# Patient Record
Sex: FEMALE | Race: ASIAN | Employment: FULL TIME | URBAN - METROPOLITAN AREA
[De-identification: names, ages, dates, MRNs, and addresses within clinical notes are randomized per-mention and may not be internally consistent; named-entity substitution may affect disease eponyms.]

---

## 2017-09-26 ENCOUNTER — HOSPITAL ENCOUNTER (OUTPATIENT)
Dept: ULTRASOUND IMAGING | Age: 52
Discharge: HOME | End: 2017-09-26
Payer: COMMERCIAL

## 2017-09-26 ENCOUNTER — HOSPITAL ENCOUNTER (OUTPATIENT)
Dept: GENERAL RADIOLOGY | Age: 52
Discharge: HOME | End: 2017-09-26
Payer: COMMERCIAL

## 2017-09-26 DIAGNOSIS — N92.6 IRREGULAR MENSES: ICD-10-CM

## 2017-09-26 DIAGNOSIS — R13.11 ORAL PHASE DYSPHAGIA: ICD-10-CM

## 2017-09-26 PROCEDURE — 2500000003 HC RX 250 WO HCPCS: Performed by: FAMILY MEDICINE

## 2017-09-26 PROCEDURE — 76830 TRANSVAGINAL US NON-OB: CPT

## 2017-09-26 PROCEDURE — 74220 X-RAY XM ESOPHAGUS 1CNTRST: CPT

## 2017-09-26 PROCEDURE — 76856 US EXAM PELVIC COMPLETE: CPT

## 2017-09-26 RX ADMIN — BARIUM SULFATE 140 ML: 980 POWDER, FOR SUSPENSION ORAL at 10:32

## 2017-09-26 RX ADMIN — BARIUM SULFATE 160 ML: 960 POWDER, FOR SUSPENSION ORAL at 10:32

## 2019-01-24 DIAGNOSIS — R13.11 ORAL PHASE DYSPHAGIA: ICD-10-CM

## 2019-01-24 RX ORDER — RANITIDINE 300 MG/1
TABLET ORAL
Qty: 90 TABLET | Refills: 1 | OUTPATIENT
Start: 2019-01-24

## 2019-01-29 ENCOUNTER — OFFICE VISIT (OUTPATIENT)
Dept: PRIMARY CARE CLINIC | Age: 54
End: 2019-01-29
Payer: COMMERCIAL

## 2019-01-29 VITALS
WEIGHT: 144.6 LBS | DIASTOLIC BLOOD PRESSURE: 86 MMHG | HEART RATE: 72 BPM | SYSTOLIC BLOOD PRESSURE: 124 MMHG | BODY MASS INDEX: 25.82 KG/M2 | OXYGEN SATURATION: 99 %

## 2019-01-29 DIAGNOSIS — K21.9 GASTROESOPHAGEAL REFLUX DISEASE, ESOPHAGITIS PRESENCE NOT SPECIFIED: Primary | ICD-10-CM

## 2019-01-29 DIAGNOSIS — R13.11 ORAL PHASE DYSPHAGIA: ICD-10-CM

## 2019-01-29 DIAGNOSIS — R06.02 SOB (SHORTNESS OF BREATH): ICD-10-CM

## 2019-01-29 PROCEDURE — G8427 DOCREV CUR MEDS BY ELIG CLIN: HCPCS | Performed by: FAMILY MEDICINE

## 2019-01-29 PROCEDURE — G8484 FLU IMMUNIZE NO ADMIN: HCPCS | Performed by: FAMILY MEDICINE

## 2019-01-29 PROCEDURE — G8419 CALC BMI OUT NRM PARAM NOF/U: HCPCS | Performed by: FAMILY MEDICINE

## 2019-01-29 PROCEDURE — 99213 OFFICE O/P EST LOW 20 MIN: CPT | Performed by: FAMILY MEDICINE

## 2019-01-29 PROCEDURE — 1036F TOBACCO NON-USER: CPT | Performed by: FAMILY MEDICINE

## 2019-01-29 PROCEDURE — 3017F COLORECTAL CA SCREEN DOC REV: CPT | Performed by: FAMILY MEDICINE

## 2019-01-29 RX ORDER — RANITIDINE 300 MG/1
300 TABLET ORAL DAILY
Qty: 90 TABLET | Refills: 3 | Status: SHIPPED | OUTPATIENT
Start: 2019-01-29 | End: 2019-09-24 | Stop reason: ALTCHOICE

## 2019-01-29 ASSESSMENT — PATIENT HEALTH QUESTIONNAIRE - PHQ9
SUM OF ALL RESPONSES TO PHQ QUESTIONS 1-9: 0
1. LITTLE INTEREST OR PLEASURE IN DOING THINGS: 0
SUM OF ALL RESPONSES TO PHQ9 QUESTIONS 1 & 2: 0
SUM OF ALL RESPONSES TO PHQ QUESTIONS 1-9: 0
2. FEELING DOWN, DEPRESSED OR HOPELESS: 0

## 2019-02-26 ENCOUNTER — OFFICE VISIT (OUTPATIENT)
Dept: PRIMARY CARE CLINIC | Age: 54
End: 2019-02-26
Payer: COMMERCIAL

## 2019-02-26 VITALS
BODY MASS INDEX: 25.76 KG/M2 | OXYGEN SATURATION: 99 % | HEIGHT: 63 IN | SYSTOLIC BLOOD PRESSURE: 124 MMHG | DIASTOLIC BLOOD PRESSURE: 70 MMHG | WEIGHT: 145.4 LBS | HEART RATE: 80 BPM

## 2019-02-26 DIAGNOSIS — K59.09 OTHER CONSTIPATION: ICD-10-CM

## 2019-02-26 DIAGNOSIS — Z13.220 SCREENING FOR HYPERLIPIDEMIA: ICD-10-CM

## 2019-02-26 DIAGNOSIS — K64.4 EXTERNAL HEMORRHOID: ICD-10-CM

## 2019-02-26 DIAGNOSIS — Z12.39 BREAST CANCER SCREENING: ICD-10-CM

## 2019-02-26 DIAGNOSIS — Z13.1 ENCOUNTER FOR SCREENING FOR DIABETES MELLITUS: ICD-10-CM

## 2019-02-26 DIAGNOSIS — Z12.31 ENCOUNTER FOR SCREENING MAMMOGRAM FOR BREAST CANCER: ICD-10-CM

## 2019-02-26 DIAGNOSIS — Z01.419 ENCOUNTER FOR GYNECOLOGICAL EXAMINATION WITHOUT ABNORMAL FINDING: Primary | ICD-10-CM

## 2019-02-26 PROCEDURE — G8484 FLU IMMUNIZE NO ADMIN: HCPCS | Performed by: FAMILY MEDICINE

## 2019-02-26 PROCEDURE — 99396 PREV VISIT EST AGE 40-64: CPT | Performed by: FAMILY MEDICINE

## 2019-02-26 RX ORDER — DOCUSATE SODIUM 100 MG/1
100 CAPSULE, LIQUID FILLED ORAL DAILY
Qty: 30 CAPSULE | Refills: 5 | COMMUNITY
Start: 2019-02-26 | End: 2019-04-09 | Stop reason: ALTCHOICE

## 2019-02-28 LAB — GYNECOLOGY CYTOLOGY REPORT: NORMAL

## 2019-03-05 ENCOUNTER — HOSPITAL ENCOUNTER (OUTPATIENT)
Age: 54
Discharge: HOME | End: 2019-03-05
Payer: COMMERCIAL

## 2019-03-05 ENCOUNTER — HOSPITAL ENCOUNTER (OUTPATIENT)
Dept: WOMENS IMAGING | Age: 54
Discharge: HOME | End: 2019-03-07
Payer: COMMERCIAL

## 2019-03-05 DIAGNOSIS — Z13.1 ENCOUNTER FOR SCREENING FOR DIABETES MELLITUS: ICD-10-CM

## 2019-03-05 DIAGNOSIS — Z12.31 ENCOUNTER FOR SCREENING MAMMOGRAM FOR BREAST CANCER: ICD-10-CM

## 2019-03-05 DIAGNOSIS — Z13.220 SCREENING FOR HYPERLIPIDEMIA: ICD-10-CM

## 2019-03-05 LAB
CHOLESTEROL, FASTING: 192 MG/DL
CHOLESTEROL/HDL RATIO: 2.9
GLUCOSE FASTING: 100 MG/DL (ref 70–99)
HDLC SERPL-MCNC: 66 MG/DL
LDL CHOLESTEROL: 95 MG/DL (ref 0–130)
TRIGLYCERIDE, FASTING: 156 MG/DL
VLDLC SERPL CALC-MCNC: ABNORMAL MG/DL (ref 1–30)

## 2019-03-05 PROCEDURE — 80061 LIPID PANEL: CPT

## 2019-03-05 PROCEDURE — 36415 COLL VENOUS BLD VENIPUNCTURE: CPT

## 2019-03-05 PROCEDURE — 77063 BREAST TOMOSYNTHESIS BI: CPT

## 2019-03-05 PROCEDURE — 82947 ASSAY GLUCOSE BLOOD QUANT: CPT

## 2019-03-20 ENCOUNTER — PROCEDURE VISIT (OUTPATIENT)
Dept: PRIMARY CARE CLINIC | Age: 54
End: 2019-03-20
Payer: COMMERCIAL

## 2019-03-20 DIAGNOSIS — Z12.11 COLON CANCER SCREENING: Primary | ICD-10-CM

## 2019-03-20 LAB
CONTROL: PRESENT
HEMOCCULT STL QL: NEGATIVE

## 2019-03-20 PROCEDURE — 82274 ASSAY TEST FOR BLOOD FECAL: CPT | Performed by: FAMILY MEDICINE

## 2019-04-09 ENCOUNTER — OFFICE VISIT (OUTPATIENT)
Dept: PRIMARY CARE CLINIC | Age: 54
End: 2019-04-09
Payer: COMMERCIAL

## 2019-04-09 VITALS
OXYGEN SATURATION: 99 % | DIASTOLIC BLOOD PRESSURE: 72 MMHG | HEART RATE: 67 BPM | TEMPERATURE: 97.8 F | BODY MASS INDEX: 26.71 KG/M2 | WEIGHT: 149.6 LBS | SYSTOLIC BLOOD PRESSURE: 112 MMHG

## 2019-04-09 DIAGNOSIS — K64.4 EXTERNAL HEMORRHOID: Primary | ICD-10-CM

## 2019-04-09 DIAGNOSIS — K59.09 OTHER CONSTIPATION: ICD-10-CM

## 2019-04-09 PROCEDURE — G8419 CALC BMI OUT NRM PARAM NOF/U: HCPCS | Performed by: PHYSICIAN ASSISTANT

## 2019-04-09 PROCEDURE — 3017F COLORECTAL CA SCREEN DOC REV: CPT | Performed by: PHYSICIAN ASSISTANT

## 2019-04-09 PROCEDURE — 99213 OFFICE O/P EST LOW 20 MIN: CPT | Performed by: PHYSICIAN ASSISTANT

## 2019-04-09 PROCEDURE — 1036F TOBACCO NON-USER: CPT | Performed by: PHYSICIAN ASSISTANT

## 2019-04-09 PROCEDURE — G8427 DOCREV CUR MEDS BY ELIG CLIN: HCPCS | Performed by: PHYSICIAN ASSISTANT

## 2019-04-09 RX ORDER — DOCUSATE SODIUM 100 MG/1
100 CAPSULE, LIQUID FILLED ORAL 2 TIMES DAILY
Qty: 60 CAPSULE | Refills: 5 | Status: SHIPPED | OUTPATIENT
Start: 2019-04-09 | End: 2019-10-21 | Stop reason: SDUPTHER

## 2019-04-09 NOTE — PROGRESS NOTES
1-1 % rectal foam Place rectally 2 times daily. 1 Can 0    hydrocortisone (ANUSOL-HC) 2.5 % rectal cream Place rectally 2 times daily.  ranitidine (ZANTAC) 300 MG tablet Take 1 tablet by mouth daily Before a meal 90 tablet 3    VENTOLIN  (90 Base) MCG/ACT inhaler Inhale 2 puffs into the lungs every 4 hours as needed for Wheezing 1 Inhaler 3    Wheat Dextrin (BENEFIBER) POWD Take 4 g by mouth 3 times daily (with meals)  0     No current facility-administered medications for this visit. No Known Allergies    Health Maintenance   Topic Date Due    HIV screen  10/08/1982    Shingles Vaccine (1 of 2) 10/08/2017    Flu vaccine (Season Ended) 09/01/2019    Colon Cancer Screen FIT/FOBT  03/20/2020    Breast cancer screen  03/05/2021    DTaP/Tdap/Td vaccine (2 - Td) 04/05/2021    Cervical cancer screen  02/26/2022    Diabetes screen  03/05/2022    Lipid screen  03/05/2024    Pneumococcal 0-64 years Vaccine  Aged Out       Subjective:      Review of Systems   Gastrointestinal: Positive for constipation and rectal pain. Negative for abdominal pain (not much, occasional LLQ), anal bleeding and diarrhea. Objective:     /72   Pulse 67   Temp 97.8 °F (36.6 °C)   Wt 149 lb 9.6 oz (67.9 kg)   SpO2 99%   BMI 26.71 kg/m²   Physical Exam   Constitutional: She appears well-developed and well-nourished. No distress. HENT:   Head: Normocephalic and atraumatic. Cardiovascular: Normal rate, regular rhythm and normal heart sounds. Pulmonary/Chest: Effort normal and breath sounds normal.   Abdominal: Soft. Bowel sounds are normal. She exhibits no distension. There is tenderness in the suprapubic area. There is no rebound and no guarding. Genitourinary: Rectal exam shows external hemorrhoid (no bleeding). Rectal exam shows no fissure and no tenderness. Skin: She is not diaphoretic. Nursing note and vitals reviewed. Assessment:       Diagnosis Orders   1.  External hemorrhoid hydrocortisone-pramoxine (PROCTOFOAM HC) 1-1 % rectal foam   2. Other constipation  docusate sodium (COLACE) 100 MG capsule        Plan:    -External hemorrhoids did not seem very large. No bleeding and no tenderness.   -Recommended tucks wipes, stool softener, and proctofoam.     Return if symptoms worsen or fail to improve. No orders of the defined types were placed in this encounter. Orders Placed This Encounter   Medications    docusate sodium (COLACE) 100 MG capsule     Sig: Take 1 capsule by mouth 2 times daily     Dispense:  60 capsule     Refill:  5    hydrocortisone-pramoxine (PROCTOFOAM HC) 1-1 % rectal foam     Sig: Place rectally 2 times daily. Dispense:  1 Can     Refill:  0       Patient given educationalmaterials - see patient instructions. Discussed use, benefit, and side effectsof prescribed medications. All patient questions answered. Pt voiced understanding. Reviewed health maintenance. Instructed to continue current medications, diet andexercise. Patient agreed with treatment plan. Follow up as directed.      Electronicallysigned by Brad Gonzalez PA-C on 4/15/2019 at 11:06 PM

## 2019-04-15 PROBLEM — K64.4 EXTERNAL HEMORRHOID: Status: ACTIVE | Noted: 2019-04-15

## 2019-04-15 ASSESSMENT — ENCOUNTER SYMPTOMS
DIARRHEA: 0
CONSTIPATION: 1
RECTAL PAIN: 1
ANAL BLEEDING: 0
ABDOMINAL PAIN: 0

## 2019-09-24 ENCOUNTER — OFFICE VISIT (OUTPATIENT)
Dept: PRIMARY CARE CLINIC | Age: 54
End: 2019-09-24
Payer: COMMERCIAL

## 2019-09-24 VITALS
SYSTOLIC BLOOD PRESSURE: 136 MMHG | HEART RATE: 97 BPM | DIASTOLIC BLOOD PRESSURE: 78 MMHG | BODY MASS INDEX: 24.89 KG/M2 | WEIGHT: 139.4 LBS | OXYGEN SATURATION: 99 %

## 2019-09-24 DIAGNOSIS — K21.9 GASTROESOPHAGEAL REFLUX DISEASE, ESOPHAGITIS PRESENCE NOT SPECIFIED: Primary | ICD-10-CM

## 2019-09-24 PROCEDURE — G8427 DOCREV CUR MEDS BY ELIG CLIN: HCPCS | Performed by: FAMILY MEDICINE

## 2019-09-24 PROCEDURE — 1036F TOBACCO NON-USER: CPT | Performed by: FAMILY MEDICINE

## 2019-09-24 PROCEDURE — 99214 OFFICE O/P EST MOD 30 MIN: CPT | Performed by: FAMILY MEDICINE

## 2019-09-24 PROCEDURE — G8420 CALC BMI NORM PARAMETERS: HCPCS | Performed by: FAMILY MEDICINE

## 2019-09-24 PROCEDURE — 3017F COLORECTAL CA SCREEN DOC REV: CPT | Performed by: FAMILY MEDICINE

## 2019-09-24 RX ORDER — PANTOPRAZOLE SODIUM 40 MG/1
40 TABLET, DELAYED RELEASE ORAL
Qty: 30 TABLET | Refills: 2 | Status: SHIPPED | OUTPATIENT
Start: 2019-09-24 | End: 2019-12-27

## 2019-09-24 ASSESSMENT — ENCOUNTER SYMPTOMS
ABDOMINAL PAIN: 1
COUGH: 0
HEARTBURN: 1

## 2019-09-24 NOTE — PROGRESS NOTES
Leighton Crockett is a 48 y.o. femalewho presents today for her medical conditions/complaints as noted below. Chief Complaint   Patient presents with    Gastroesophageal Reflux         HPI:     Gastroesophageal Reflux   She complains of abdominal pain, chest pain and heartburn. She reports no coughing or no dysphagia. This is a chronic problem. The current episode started more than 1 year ago. The problem occurs frequently. The problem has been gradually worsening. The heartburn is located in the substernum. The heartburn is of moderate intensity. The heartburn wakes her from sleep. The heartburn does not limit her activity. The heartburn doesn't change with position. Exacerbated by: worse with eating. Pertinent negatives include no weight loss. There are no known risk factors. She has tried a histamine-2 antagonist for the symptoms. The treatment provided no relief. Past procedures include a UGI. Current Outpatient Medications   Medication Sig Dispense Refill    pantoprazole (PROTONIX) 40 MG tablet Take 1 tablet by mouth every morning (before breakfast) 30 tablet 2    docusate sodium (COLACE) 100 MG capsule Take 1 capsule by mouth 2 times daily 60 capsule 5    hydrocortisone-pramoxine (PROCTOFOAM HC) 1-1 % rectal foam Place rectally 2 times daily. 1 Can 0    hydrocortisone (ANUSOL-HC) 2.5 % rectal cream Place rectally 2 times daily.  VENTOLIN  (90 Base) MCG/ACT inhaler Inhale 2 puffs into the lungs every 4 hours as needed for Wheezing 1 Inhaler 3    Wheat Dextrin (BENEFIBER) POWD Take 4 g by mouth 3 times daily (with meals)  0     No current facility-administered medications for this visit. No Known Allergies    Subjective:     Review of Systems   Constitutional: Negative for weight loss. Respiratory: Negative for cough. Cardiovascular: Positive for chest pain. Gastrointestinal: Positive for abdominal pain and heartburn. Negative for dysphagia.    Neurological:        Left

## 2019-09-25 ENCOUNTER — TELEPHONE (OUTPATIENT)
Dept: GASTROENTEROLOGY | Age: 54
End: 2019-09-25

## 2019-10-02 DIAGNOSIS — K64.4 EXTERNAL HEMORRHOID: ICD-10-CM

## 2019-10-21 DIAGNOSIS — K59.09 OTHER CONSTIPATION: ICD-10-CM

## 2019-10-22 ENCOUNTER — OFFICE VISIT (OUTPATIENT)
Dept: GASTROENTEROLOGY | Age: 54
End: 2019-10-22
Payer: COMMERCIAL

## 2019-10-22 VITALS
BODY MASS INDEX: 24.98 KG/M2 | WEIGHT: 139.9 LBS | DIASTOLIC BLOOD PRESSURE: 66 MMHG | SYSTOLIC BLOOD PRESSURE: 112 MMHG | HEART RATE: 74 BPM

## 2019-10-22 DIAGNOSIS — Z12.11 SCREEN FOR COLON CANCER: ICD-10-CM

## 2019-10-22 DIAGNOSIS — K59.09 OTHER CONSTIPATION: ICD-10-CM

## 2019-10-22 DIAGNOSIS — K21.9 GASTROESOPHAGEAL REFLUX DISEASE WITHOUT ESOPHAGITIS: Primary | ICD-10-CM

## 2019-10-22 DIAGNOSIS — K64.4 EXTERNAL HEMORRHOID: ICD-10-CM

## 2019-10-22 PROCEDURE — G8420 CALC BMI NORM PARAMETERS: HCPCS | Performed by: INTERNAL MEDICINE

## 2019-10-22 PROCEDURE — G8427 DOCREV CUR MEDS BY ELIG CLIN: HCPCS | Performed by: INTERNAL MEDICINE

## 2019-10-22 PROCEDURE — 99244 OFF/OP CNSLTJ NEW/EST MOD 40: CPT | Performed by: INTERNAL MEDICINE

## 2019-10-22 PROCEDURE — G8484 FLU IMMUNIZE NO ADMIN: HCPCS | Performed by: INTERNAL MEDICINE

## 2019-10-22 RX ORDER — POLYETHYLENE GLYCOL 3350 17 G/17G
POWDER, FOR SOLUTION ORAL
Qty: 238 G | Refills: 0 | Status: SHIPPED | OUTPATIENT
Start: 2019-10-22 | End: 2019-10-29

## 2019-10-22 RX ORDER — DOCUSATE SODIUM 100 MG/1
CAPSULE ORAL
Qty: 60 CAPSULE | Refills: 5 | Status: SHIPPED | OUTPATIENT
Start: 2019-10-22 | End: 2020-05-12

## 2019-10-22 ASSESSMENT — ENCOUNTER SYMPTOMS
NAUSEA: 0
SINUS PRESSURE: 0
COUGH: 1
SORE THROAT: 1
BACK PAIN: 1
CHOKING: 1
DIARRHEA: 1
CONSTIPATION: 1
VOICE CHANGE: 0
ABDOMINAL PAIN: 1
WHEEZING: 0
TROUBLE SWALLOWING: 1
SHORTNESS OF BREATH: 1
RECTAL PAIN: 1
BLOOD IN STOOL: 1
ANAL BLEEDING: 1
VOMITING: 0
ABDOMINAL DISTENTION: 0

## 2019-10-29 ENCOUNTER — OFFICE VISIT (OUTPATIENT)
Dept: PRIMARY CARE CLINIC | Age: 54
End: 2019-10-29
Payer: COMMERCIAL

## 2019-10-29 VITALS
HEIGHT: 63 IN | SYSTOLIC BLOOD PRESSURE: 134 MMHG | WEIGHT: 141 LBS | DIASTOLIC BLOOD PRESSURE: 82 MMHG | OXYGEN SATURATION: 99 % | BODY MASS INDEX: 24.98 KG/M2 | HEART RATE: 91 BPM

## 2019-10-29 DIAGNOSIS — R06.02 SHORTNESS OF BREATH: ICD-10-CM

## 2019-10-29 DIAGNOSIS — R12 HEART BURN: ICD-10-CM

## 2019-10-29 DIAGNOSIS — K64.4 EXTERNAL HEMORRHOID: ICD-10-CM

## 2019-10-29 DIAGNOSIS — Z00.00 WELLNESS EXAMINATION: Primary | ICD-10-CM

## 2019-10-29 DIAGNOSIS — Z23 NEEDS FLU SHOT: ICD-10-CM

## 2019-10-29 PROCEDURE — 99213 OFFICE O/P EST LOW 20 MIN: CPT | Performed by: FAMILY MEDICINE

## 2019-10-29 PROCEDURE — 3017F COLORECTAL CA SCREEN DOC REV: CPT | Performed by: FAMILY MEDICINE

## 2019-10-29 PROCEDURE — 1036F TOBACCO NON-USER: CPT | Performed by: FAMILY MEDICINE

## 2019-10-29 PROCEDURE — G8419 CALC BMI OUT NRM PARAM NOF/U: HCPCS | Performed by: FAMILY MEDICINE

## 2019-10-29 PROCEDURE — 90471 IMMUNIZATION ADMIN: CPT | Performed by: FAMILY MEDICINE

## 2019-10-29 PROCEDURE — 90686 IIV4 VACC NO PRSV 0.5 ML IM: CPT | Performed by: FAMILY MEDICINE

## 2019-10-29 PROCEDURE — G8482 FLU IMMUNIZE ORDER/ADMIN: HCPCS | Performed by: FAMILY MEDICINE

## 2019-10-29 PROCEDURE — G8427 DOCREV CUR MEDS BY ELIG CLIN: HCPCS | Performed by: FAMILY MEDICINE

## 2019-10-29 RX ORDER — MONTELUKAST SODIUM 10 MG/1
10 TABLET ORAL DAILY
Qty: 30 TABLET | Refills: 3 | Status: SHIPPED | OUTPATIENT
Start: 2019-10-29 | End: 2020-03-09

## 2019-10-29 RX ORDER — PREDNISONE 20 MG/1
20 TABLET ORAL DAILY
Qty: 7 TABLET | Refills: 0 | Status: SHIPPED | OUTPATIENT
Start: 2019-10-29 | End: 2019-11-05

## 2019-10-29 ASSESSMENT — ENCOUNTER SYMPTOMS
SORE THROAT: 0
SHORTNESS OF BREATH: 1
BLOOD IN STOOL: 1
ABDOMINAL PAIN: 0
CHEST TIGHTNESS: 1

## 2019-11-06 ENCOUNTER — TELEPHONE (OUTPATIENT)
Dept: GASTROENTEROLOGY | Age: 54
End: 2019-11-06

## 2019-11-12 ENCOUNTER — ANESTHESIA (OUTPATIENT)
Dept: ENDOSCOPY | Age: 54
End: 2019-11-12
Payer: COMMERCIAL

## 2019-11-12 ENCOUNTER — ANESTHESIA EVENT (OUTPATIENT)
Dept: ENDOSCOPY | Age: 54
End: 2019-11-12
Payer: COMMERCIAL

## 2019-11-12 ENCOUNTER — HOSPITAL ENCOUNTER (OUTPATIENT)
Age: 54
Setting detail: OUTPATIENT SURGERY
Discharge: HOME | End: 2019-11-12
Attending: INTERNAL MEDICINE | Admitting: INTERNAL MEDICINE
Payer: COMMERCIAL

## 2019-11-12 VITALS — TEMPERATURE: 97.5 F | OXYGEN SATURATION: 100 % | DIASTOLIC BLOOD PRESSURE: 51 MMHG | SYSTOLIC BLOOD PRESSURE: 102 MMHG

## 2019-11-12 VITALS
TEMPERATURE: 97.9 F | OXYGEN SATURATION: 100 % | BODY MASS INDEX: 24.98 KG/M2 | HEIGHT: 63 IN | HEART RATE: 70 BPM | RESPIRATION RATE: 16 BRPM | DIASTOLIC BLOOD PRESSURE: 65 MMHG | WEIGHT: 141 LBS | SYSTOLIC BLOOD PRESSURE: 128 MMHG

## 2019-11-12 PROCEDURE — 3700000001 HC ADD 15 MINUTES (ANESTHESIA): Performed by: INTERNAL MEDICINE

## 2019-11-12 PROCEDURE — 3609010400 HC COLONOSCOPY POLYPECTOMY HOT BIOPSY: Performed by: INTERNAL MEDICINE

## 2019-11-12 PROCEDURE — 6360000002 HC RX W HCPCS: Performed by: NURSE ANESTHETIST, CERTIFIED REGISTERED

## 2019-11-12 PROCEDURE — 2500000003 HC RX 250 WO HCPCS: Performed by: NURSE ANESTHETIST, CERTIFIED REGISTERED

## 2019-11-12 PROCEDURE — 3700000000 HC ANESTHESIA ATTENDED CARE: Performed by: INTERNAL MEDICINE

## 2019-11-12 PROCEDURE — 43239 EGD BIOPSY SINGLE/MULTIPLE: CPT | Performed by: INTERNAL MEDICINE

## 2019-11-12 PROCEDURE — 2780000010 HC IMPLANT OTHER: Performed by: INTERNAL MEDICINE

## 2019-11-12 PROCEDURE — 88305 TISSUE EXAM BY PATHOLOGIST: CPT

## 2019-11-12 PROCEDURE — 3609019000 HC EGD CAPSULE ENDOSCOPY: Performed by: INTERNAL MEDICINE

## 2019-11-12 PROCEDURE — 45385 COLONOSCOPY W/LESION REMOVAL: CPT | Performed by: INTERNAL MEDICINE

## 2019-11-12 PROCEDURE — 7100000001 HC PACU RECOVERY - ADDTL 15 MIN: Performed by: INTERNAL MEDICINE

## 2019-11-12 PROCEDURE — 2580000003 HC RX 258: Performed by: INTERNAL MEDICINE

## 2019-11-12 PROCEDURE — 7100000000 HC PACU RECOVERY - FIRST 15 MIN: Performed by: INTERNAL MEDICINE

## 2019-11-12 PROCEDURE — 2709999900 HC NON-CHARGEABLE SUPPLY: Performed by: INTERNAL MEDICINE

## 2019-11-12 RX ORDER — PROMETHAZINE HYDROCHLORIDE 25 MG/ML
6.25 INJECTION, SOLUTION INTRAMUSCULAR; INTRAVENOUS
Status: DISCONTINUED | OUTPATIENT
Start: 2019-11-12 | End: 2019-11-12

## 2019-11-12 RX ORDER — PROPOFOL 10 MG/ML
INJECTION, EMULSION INTRAVENOUS PRN
Status: DISCONTINUED | OUTPATIENT
Start: 2019-11-12 | End: 2019-11-12 | Stop reason: SDUPTHER

## 2019-11-12 RX ORDER — SODIUM CHLORIDE, SODIUM LACTATE, POTASSIUM CHLORIDE, CALCIUM CHLORIDE 600; 310; 30; 20 MG/100ML; MG/100ML; MG/100ML; MG/100ML
INJECTION, SOLUTION INTRAVENOUS CONTINUOUS
Status: DISCONTINUED | OUTPATIENT
Start: 2019-11-12 | End: 2019-11-12 | Stop reason: HOSPADM

## 2019-11-12 RX ORDER — DIPHENHYDRAMINE HYDROCHLORIDE 50 MG/ML
12.5 INJECTION INTRAMUSCULAR; INTRAVENOUS
Status: DISCONTINUED | OUTPATIENT
Start: 2019-11-12 | End: 2019-11-12 | Stop reason: HOSPADM

## 2019-11-12 RX ORDER — LIDOCAINE HYDROCHLORIDE 10 MG/ML
INJECTION, SOLUTION EPIDURAL; INFILTRATION; INTRACAUDAL; PERINEURAL PRN
Status: DISCONTINUED | OUTPATIENT
Start: 2019-11-12 | End: 2019-11-12 | Stop reason: SDUPTHER

## 2019-11-12 RX ORDER — ONDANSETRON 2 MG/ML
4 INJECTION INTRAMUSCULAR; INTRAVENOUS
Status: DISCONTINUED | OUTPATIENT
Start: 2019-11-12 | End: 2019-11-12 | Stop reason: HOSPADM

## 2019-11-12 RX ORDER — 0.9 % SODIUM CHLORIDE 0.9 %
500 INTRAVENOUS SOLUTION INTRAVENOUS
Status: DISCONTINUED | OUTPATIENT
Start: 2019-11-12 | End: 2019-11-12 | Stop reason: HOSPADM

## 2019-11-12 RX ORDER — HYDRALAZINE HYDROCHLORIDE 20 MG/ML
5 INJECTION INTRAMUSCULAR; INTRAVENOUS EVERY 10 MIN PRN
Status: DISCONTINUED | OUTPATIENT
Start: 2019-11-12 | End: 2019-11-12 | Stop reason: HOSPADM

## 2019-11-12 RX ORDER — LABETALOL 20 MG/4 ML (5 MG/ML) INTRAVENOUS SYRINGE
5 EVERY 10 MIN PRN
Status: DISCONTINUED | OUTPATIENT
Start: 2019-11-12 | End: 2019-11-12 | Stop reason: HOSPADM

## 2019-11-12 RX ORDER — PROPOFOL 10 MG/ML
INJECTION, EMULSION INTRAVENOUS CONTINUOUS PRN
Status: DISCONTINUED | OUTPATIENT
Start: 2019-11-12 | End: 2019-11-12 | Stop reason: SDUPTHER

## 2019-11-12 RX ADMIN — PROPOFOL 120 MG: 10 INJECTION, EMULSION INTRAVENOUS at 07:39

## 2019-11-12 RX ADMIN — SODIUM CHLORIDE, POTASSIUM CHLORIDE, SODIUM LACTATE AND CALCIUM CHLORIDE: 600; 310; 30; 20 INJECTION, SOLUTION INTRAVENOUS at 06:53

## 2019-11-12 RX ADMIN — PROPOFOL 50 MG: 10 INJECTION, EMULSION INTRAVENOUS at 08:17

## 2019-11-12 RX ADMIN — LIDOCAINE HYDROCHLORIDE 50 MG: 10 INJECTION, SOLUTION EPIDURAL; INFILTRATION; INTRACAUDAL at 07:39

## 2019-11-12 RX ADMIN — PROPOFOL 40 MG: 10 INJECTION, EMULSION INTRAVENOUS at 08:00

## 2019-11-12 RX ADMIN — PROPOFOL 125 MCG/KG/MIN: 10 INJECTION, EMULSION INTRAVENOUS at 07:39

## 2019-11-12 RX ADMIN — PROPOFOL 40 MG: 10 INJECTION, EMULSION INTRAVENOUS at 08:03

## 2019-11-12 ASSESSMENT — PULMONARY FUNCTION TESTS
PIF_VALUE: 0
PIF_VALUE: 1
PIF_VALUE: 0
PIF_VALUE: 1
PIF_VALUE: 0
PIF_VALUE: 1
PIF_VALUE: 1
PIF_VALUE: 0
PIF_VALUE: 1
PIF_VALUE: 0
PIF_VALUE: 0
PIF_VALUE: 1
PIF_VALUE: 0
PIF_VALUE: 0
PIF_VALUE: 1
PIF_VALUE: 1
PIF_VALUE: 0
PIF_VALUE: 0
PIF_VALUE: 1
PIF_VALUE: 0
PIF_VALUE: 1
PIF_VALUE: 0
PIF_VALUE: 1
PIF_VALUE: 1
PIF_VALUE: 0
PIF_VALUE: 1
PIF_VALUE: 0
PIF_VALUE: 0
PIF_VALUE: 1
PIF_VALUE: 0
PIF_VALUE: 1
PIF_VALUE: 1
PIF_VALUE: 0
PIF_VALUE: 1
PIF_VALUE: 0

## 2019-11-12 ASSESSMENT — PAIN DESCRIPTION - LOCATION: LOCATION: ABDOMEN

## 2019-11-12 ASSESSMENT — PAIN DESCRIPTION - DESCRIPTORS: DESCRIPTORS: CRAMPING

## 2019-11-12 ASSESSMENT — PAIN - FUNCTIONAL ASSESSMENT: PAIN_FUNCTIONAL_ASSESSMENT: 0-10

## 2019-11-13 LAB — SURGICAL PATHOLOGY REPORT: NORMAL

## 2019-11-25 PROBLEM — Z86.0100 HISTORY OF COLON POLYPS: Status: ACTIVE | Noted: 2019-11-12

## 2019-11-25 PROBLEM — Z86.010 HISTORY OF COLON POLYPS: Status: ACTIVE | Noted: 2019-11-12

## 2019-11-26 ENCOUNTER — OFFICE VISIT (OUTPATIENT)
Dept: PRIMARY CARE CLINIC | Age: 54
End: 2019-11-26
Payer: COMMERCIAL

## 2019-11-26 VITALS
DIASTOLIC BLOOD PRESSURE: 74 MMHG | WEIGHT: 141.8 LBS | BODY MASS INDEX: 25.12 KG/M2 | OXYGEN SATURATION: 96 % | SYSTOLIC BLOOD PRESSURE: 118 MMHG | HEART RATE: 74 BPM

## 2019-11-26 DIAGNOSIS — K64.4 EXTERNAL HEMORRHOID: ICD-10-CM

## 2019-11-26 DIAGNOSIS — K21.9 GASTROESOPHAGEAL REFLUX DISEASE, ESOPHAGITIS PRESENCE NOT SPECIFIED: ICD-10-CM

## 2019-11-26 DIAGNOSIS — R06.02 SHORTNESS OF BREATH: Primary | ICD-10-CM

## 2019-11-26 DIAGNOSIS — K59.09 OTHER CONSTIPATION: ICD-10-CM

## 2019-11-26 PROCEDURE — 3017F COLORECTAL CA SCREEN DOC REV: CPT | Performed by: FAMILY MEDICINE

## 2019-11-26 PROCEDURE — 99213 OFFICE O/P EST LOW 20 MIN: CPT | Performed by: FAMILY MEDICINE

## 2019-11-26 PROCEDURE — 1036F TOBACCO NON-USER: CPT | Performed by: FAMILY MEDICINE

## 2019-11-26 PROCEDURE — G8419 CALC BMI OUT NRM PARAM NOF/U: HCPCS | Performed by: FAMILY MEDICINE

## 2019-11-26 PROCEDURE — G8427 DOCREV CUR MEDS BY ELIG CLIN: HCPCS | Performed by: FAMILY MEDICINE

## 2019-11-26 PROCEDURE — G8482 FLU IMMUNIZE ORDER/ADMIN: HCPCS | Performed by: FAMILY MEDICINE

## 2019-11-26 RX ORDER — RANITIDINE 300 MG/1
TABLET ORAL
Refills: 3 | COMMUNITY
Start: 2019-11-11 | End: 2019-12-17

## 2019-11-26 ASSESSMENT — ENCOUNTER SYMPTOMS
SHORTNESS OF BREATH: 1
SINUS PAIN: 0
RHINORRHEA: 0
VOMITING: 0
DIARRHEA: 0
CHEST TIGHTNESS: 1
COUGH: 0
CONSTIPATION: 1
SORE THROAT: 0
WHEEZING: 1
NAUSEA: 0
ABDOMINAL PAIN: 0
SINUS PRESSURE: 0

## 2019-12-03 ENCOUNTER — HOSPITAL ENCOUNTER (OUTPATIENT)
Age: 54
Discharge: HOME | End: 2019-12-05
Payer: COMMERCIAL

## 2019-12-03 ENCOUNTER — HOSPITAL ENCOUNTER (OUTPATIENT)
Dept: GENERAL RADIOLOGY | Age: 54
Discharge: HOME | End: 2019-12-05
Payer: COMMERCIAL

## 2019-12-03 ENCOUNTER — OFFICE VISIT (OUTPATIENT)
Dept: GASTROENTEROLOGY | Age: 54
End: 2019-12-03
Payer: COMMERCIAL

## 2019-12-03 VITALS
HEART RATE: 75 BPM | BODY MASS INDEX: 24.82 KG/M2 | WEIGHT: 140.1 LBS | DIASTOLIC BLOOD PRESSURE: 63 MMHG | SYSTOLIC BLOOD PRESSURE: 124 MMHG

## 2019-12-03 DIAGNOSIS — R06.02 SHORTNESS OF BREATH: ICD-10-CM

## 2019-12-03 DIAGNOSIS — K21.9 GASTROESOPHAGEAL REFLUX DISEASE WITHOUT ESOPHAGITIS: Primary | ICD-10-CM

## 2019-12-03 DIAGNOSIS — K59.09 OTHER CONSTIPATION: ICD-10-CM

## 2019-12-03 PROCEDURE — G8482 FLU IMMUNIZE ORDER/ADMIN: HCPCS | Performed by: NURSE PRACTITIONER

## 2019-12-03 PROCEDURE — G8420 CALC BMI NORM PARAMETERS: HCPCS | Performed by: NURSE PRACTITIONER

## 2019-12-03 PROCEDURE — 99213 OFFICE O/P EST LOW 20 MIN: CPT | Performed by: NURSE PRACTITIONER

## 2019-12-03 PROCEDURE — G8427 DOCREV CUR MEDS BY ELIG CLIN: HCPCS | Performed by: NURSE PRACTITIONER

## 2019-12-03 PROCEDURE — 1036F TOBACCO NON-USER: CPT | Performed by: NURSE PRACTITIONER

## 2019-12-03 PROCEDURE — 3017F COLORECTAL CA SCREEN DOC REV: CPT | Performed by: NURSE PRACTITIONER

## 2019-12-03 PROCEDURE — 71046 X-RAY EXAM CHEST 2 VIEWS: CPT

## 2019-12-03 RX ORDER — CITALOPRAM 10 MG/1
10 TABLET ORAL DAILY
Qty: 30 TABLET | Refills: 3 | Status: SHIPPED | OUTPATIENT
Start: 2019-12-03 | End: 2020-08-04 | Stop reason: SDUPTHER

## 2019-12-03 ASSESSMENT — ENCOUNTER SYMPTOMS
ANAL BLEEDING: 0
WHEEZING: 0
SINUS PRESSURE: 0
CONSTIPATION: 1
DIARRHEA: 0
ALLERGIC/IMMUNOLOGIC NEGATIVE: 1
COUGH: 1
ABDOMINAL DISTENTION: 0
CHOKING: 0
NAUSEA: 1
TROUBLE SWALLOWING: 1
SORE THROAT: 0
BACK PAIN: 1
VOMITING: 0
BLOOD IN STOOL: 0
VOICE CHANGE: 0
ABDOMINAL PAIN: 1
RECTAL PAIN: 0

## 2019-12-17 ENCOUNTER — TELEPHONE (OUTPATIENT)
Dept: GASTROENTEROLOGY | Age: 54
End: 2019-12-17

## 2019-12-17 ENCOUNTER — TELEPHONE (OUTPATIENT)
Dept: PRIMARY CARE CLINIC | Age: 54
End: 2019-12-17

## 2019-12-17 DIAGNOSIS — K21.9 GASTROESOPHAGEAL REFLUX DISEASE, ESOPHAGITIS PRESENCE NOT SPECIFIED: Primary | ICD-10-CM

## 2019-12-17 RX ORDER — FAMOTIDINE 20 MG/1
20 TABLET, FILM COATED ORAL 2 TIMES DAILY
Qty: 60 TABLET | Refills: 3 | Status: SHIPPED | OUTPATIENT
Start: 2019-12-17 | End: 2021-06-29 | Stop reason: ALTCHOICE

## 2019-12-18 RX ORDER — ESCITALOPRAM OXALATE 5 MG/1
5 TABLET ORAL DAILY
Qty: 90 TABLET | Refills: 1 | Status: SHIPPED | OUTPATIENT
Start: 2019-12-18 | End: 2020-07-13

## 2020-01-28 ENCOUNTER — OFFICE VISIT (OUTPATIENT)
Dept: PRIMARY CARE CLINIC | Age: 55
End: 2020-01-28
Payer: COMMERCIAL

## 2020-01-28 VITALS
SYSTOLIC BLOOD PRESSURE: 118 MMHG | DIASTOLIC BLOOD PRESSURE: 80 MMHG | OXYGEN SATURATION: 98 % | BODY MASS INDEX: 24.34 KG/M2 | WEIGHT: 137.4 LBS | HEART RATE: 91 BPM

## 2020-01-28 LAB
BILIRUBIN, POC: ABNORMAL
BLOOD URINE, POC: ABNORMAL
CHP ED QC CHECK: ABNORMAL
CLARITY, POC: ABNORMAL
COLOR, POC: ABNORMAL
GLUCOSE BLD-MCNC: 163 MG/DL
GLUCOSE URINE, POC: 500
HBA1C MFR BLD: 5.3 %
KETONES, POC: ABNORMAL
LEUKOCYTE EST, POC: ABNORMAL
NITRITE, POC: ABNORMAL
PH, POC: 6
PROTEIN, POC: ABNORMAL
SPECIFIC GRAVITY, POC: 1.01
UROBILINOGEN, POC: 0.2

## 2020-01-28 PROCEDURE — S0612 ANNUAL GYNECOLOGICAL EXAMINA: HCPCS | Performed by: FAMILY MEDICINE

## 2020-01-28 PROCEDURE — 82962 GLUCOSE BLOOD TEST: CPT | Performed by: FAMILY MEDICINE

## 2020-01-28 PROCEDURE — 81003 URINALYSIS AUTO W/O SCOPE: CPT | Performed by: FAMILY MEDICINE

## 2020-01-28 PROCEDURE — 83036 HEMOGLOBIN GLYCOSYLATED A1C: CPT | Performed by: FAMILY MEDICINE

## 2020-01-28 RX ORDER — AMOXICILLIN 500 MG/1
CAPSULE ORAL
COMMUNITY
Start: 2020-01-28 | End: 2021-11-30 | Stop reason: ALTCHOICE

## 2020-01-28 ASSESSMENT — PATIENT HEALTH QUESTIONNAIRE - PHQ9
SUM OF ALL RESPONSES TO PHQ QUESTIONS 1-9: 0
1. LITTLE INTEREST OR PLEASURE IN DOING THINGS: 0
SUM OF ALL RESPONSES TO PHQ QUESTIONS 1-9: 0
SUM OF ALL RESPONSES TO PHQ9 QUESTIONS 1 & 2: 0
2. FEELING DOWN, DEPRESSED OR HOPELESS: 0

## 2020-01-28 NOTE — PATIENT INSTRUCTIONS
Patient Education        Musculoskeletal Chest Pain: Care Instructions  Your Care Instructions    Chest pain is not always a sign that something is wrong with your heart or that you have another serious problem. The doctor thinks your chest pain is caused by strained muscles or ligaments, inflamed chest cartilage, or another problem in your chest, rather than by your heart. You may need more tests to find the cause of your chest pain. Follow-up care is a key part of your treatment and safety. Be sure to make and go to all appointments, and call your doctor if you are having problems. It's also a good idea to know your test results and keep a list of the medicines you take. How can you care for yourself at home? · Take pain medicines exactly as directed. ? If the doctor gave you a prescription medicine for pain, take it as prescribed. ? If you are not taking a prescription pain medicine, ask your doctor if you can take an over-the-counter medicine. · Rest and protect the sore area. · Stop, change, or take a break from any activity that may be causing your pain or soreness. · Put ice or a cold pack on the sore area for 10 to 20 minutes at a time. Try to do this every 1 to 2 hours for the next 3 days (when you are awake) or until the swelling goes down. Put a thin cloth between the ice and your skin. · After 2 or 3 days, apply a heating pad set on low or a warm cloth to the area that hurts. Some doctors suggest that you go back and forth between hot and cold. · Do not wrap or tape your ribs for support. This may cause you to take smaller breaths, which could increase your risk of lung problems. · Mentholated creams such as Bengay or Icy Hot may soothe sore muscles. Follow the instructions on the package. · Follow your doctor's instructions for exercising. · Gentle stretching and massage may help you get better faster.  Stretch slowly to the point just before pain begins, and hold the stretch for at least Your doctor has checked your overall health and may have suggested ways to take good care of yourself. He or she also may have recommended tests. At home, you can help prevent illness with healthy eating, regular exercise, and other steps. Follow-up care is a key part of your treatment and safety. Be sure to make and go to all appointments, and call your doctor if you are having problems. It's also a good idea to know your test results and keep a list of the medicines you take. How can you care for yourself at home? · Reach and stay at a healthy weight. This will lower your risk for many problems, such as obesity, diabetes, heart disease, and high blood pressure. · Get at least 30 minutes of exercise on most days of the week. Walking is a good choice. You also may want to do other activities, such as running, swimming, cycling, or playing tennis or team sports. · Do not smoke. Smoking can make health problems worse. If you need help quitting, talk to your doctor about stop-smoking programs and medicines. These can increase your chances of quitting for good. · Protect your skin from too much sun. When you're outdoors from 10 a.m. to 4 p.m., stay in the shade or cover up with clothing and a hat with a wide brim. Wear sunglasses that block UV rays. Even when it's cloudy, put broad-spectrum sunscreen (SPF 30 or higher) on any exposed skin. · See a dentist one or two times a year for checkups and to have your teeth cleaned. · Wear a seat belt in the car. Follow your doctor's advice about when to have certain tests. These tests can spot problems early. · Cholesterol. Your doctor will tell you how often to have this done based on your age, family history, or other things that can increase your risk for heart attack and stroke. · Blood pressure. Have your blood pressure checked during a routine doctor visit.  Your doctor will tell you how often to check your blood pressure based on your age, your blood pressure results, and other factors. · Mammogram. Ask your doctor how often you should have a mammogram, which is an X-ray of your breasts. A mammogram can spot breast cancer before it can be felt and when it is easiest to treat. · Pap test and pelvic exam. Ask your doctor how often you should have a Pap test. You may not need to have a Pap test as often as you used to. · Vision. Have your eyes checked every year or two or as often as your doctor suggests. Some experts recommend that you have yearly exams for glaucoma and other age-related eye problems starting at age 48. · Hearing. Tell your doctor if you notice any change in your hearing. You can have tests to find out how well you hear. · Diabetes. Ask your doctor whether you should have tests for diabetes. · Colorectal cancer. Your risk for colorectal cancer gets higher as you get older. Some experts say that adults should start regular screening at age 48 and stop at age 76. Others say to start before age 48 or continue after age 76. Talk with your doctor about your risk and when to start and stop screening. · Thyroid disease. Talk to your doctor about whether to have your thyroid checked as part of a regular physical exam. Women have an increased chance of a thyroid problem. · Osteoporosis. You should begin tests for bone density at age 72. If you are younger than 72, ask your doctor whether you have factors that may increase your risk for this disease. You may want to have this test before age 72. · Heart attack and stroke risk. At least every 4 to 6 years, you should have your risk for heart attack and stroke assessed. Your doctor uses factors such as your age, blood pressure, cholesterol, and whether you smoke or have diabetes to show what your risk for a heart attack or stroke is over the next 10 years. When should you call for help?   Watch closely for changes in your health, and be sure to contact your doctor if you have any problems or symptoms that concern you. Where can you learn more? Go to https://chpepiceweb.[employer]Leads [employer]. org and sign in to your [employer] account. Enter S069 in the [facility] box to learn more about \"Well Visit, Women 50 to 72: Care Instructions. \"     If you do not have an account, please click on the \"Sign Up Now\" link. Current as of: August 21, 2019  Content Version: 12.3  © 6363-1183 [employer]. Care instructions adapted under license by [facility] ([facility]). If you have questions about a medical condition or this instruction, always ask your healthcare professional. [address] any warranty or liability for your use of this information. Patient Education        Pap Test: Care Instructions  Your Care Instructions    The Pap test (also called a Pap smear) is a screening test for cancer of the cervix, which is the lower part of the uterus that opens into the vagina. The test can help your doctor find early changes in the cells that could lead to cancer. The sample of cells taken during your test has been sent to a lab so that an expert can look at the cells. It usually takes a week or two to get the results back. Follow-up care is a key part of your treatment and safety. Be sure to make and go to all appointments, and call your doctor if you are having problems. It's also a good idea to know your test results and keep a list of the medicines you take. What do the results mean? · A normal result means that the test did not find any abnormal cells in the sample. · An abnormal result can mean many things. Most of these are not cancer. The results of your test may be abnormal because:  ? You have an infection of the vagina or cervix, such as a yeast infection. ? You have an IUD (intrauterine device for birth control). ? You have low estrogen levels after menopause that are causing the cells to change. ? You have cell changes that may be a sign of precancer or cancer.  The results are ranked based on how serious the changes might be. There are many other reasons why you might not get a normal result. If the results were abnormal, you may need to get another test within a few weeks or months. If the results show changes that could be a sign of cancer, you may need a test called a colposcopy, which provides a more complete view of the cervix. Sometimes the lab cannot use the sample because it does not contain enough cells or was not preserved well. If so, you may need to have the test again. This is not common, but it does happen from time to time. When should you call for help? Watch closely for changes in your health, and be sure to contact your doctor if:    · You have vaginal bleeding or pain for more than 2 days after the test. It is normal to have a small amount of bleeding for a day or two after the test.   Where can you learn more? Go to https://CITIA.[employer]. org and sign in to your [employer] account. Enter N499 in the [employer] box to learn more about \"Pap Test: Care Instructions. \"     If you do not have an account, please click on the \"Sign Up Now\" link. Current as of: August 21, 2019  Content Version: 12.3  © 9988-6679 [employer]. Care instructions adapted under license by [facility] ([facility]). If you have questions about a medical condition or this instruction, always ask your healthcare professional. [address] any warranty or liability for your use of this information.

## 2020-01-28 NOTE — PROGRESS NOTES
[address] [place] [address] PRIMARY CARE  [address] [place] [address] [facility] [address] [address]  Dept: [phone]  Dept Fax: [phone]    Rd Sarmiento a 48 y.o. female who presents today  for her physical.  Delmi Yan is c/o of   Chief Complaint   Patient presents with    Gynecologic Exam    Cough    Chest Pain       HPI:     HPI  LMP last 2019  No hot flashes. Contraception: none  Menstrual history:    Cough and cold sx 3 weeks ago, still has some coughing and having some mid sternal pain also x 3 weeks. Sternal pain can occur during the day and at night. OB History    Para Term  AB Living   2 1 1     1   SAB TAB Ectopic Molar Multiple Live Births                    # Outcome Date GA Lbr [place]/2nd Weight Sex Delivery Anes PTL Lv   2             1 Term                LDL Cholesterol (mg/dL)   Date Value   2019 95   2014 123 (H)       (goal LDL is <100)   No results found for: AST, ALT, BUN    Past Medical History:   Diagnosis Date    Asthma     GERD (gastroesophageal reflux disease)     History of colon polyps 2019    tubular adenoma x2     Past Surgical History:   Procedure Laterality Date     SECTION      COLONOSCOPY N/A 2019    COLONOSCOPY POLYPECTOMY HOT SNARE performed by Umesh Vega MD at [facility] 95 N/A 2019    ESOPHAGEAL CAPSULE ENDOSCOPY - EGD W/BRAVO PLACED AT 29CM, COLD BIOPSY performed by Umesh Vega MD at [facility] Right     right bottom       No family history on file.     Social History     Tobacco Use    Smoking status: Never Smoker    Smokeless tobacco: Never Used   Substance Use Topics    Alcohol use: No     Alcohol/week: 0.0 standard drinks      Current Outpatient Medications   Medication Sig Dispense Refill    amoxicillin (AMOXIL) 500 MG capsule       pantoprazole (PROTONIX) 40 MG tablet TAKE 1 TABLET BY MOUTH ONCE Rhythm: Normal rate and regular rhythm. Heart sounds: Normal heart sounds. Pulmonary:      Effort: Pulmonary effort is normal. No respiratory distress. Breath sounds: Normal breath sounds. Chest:      Chest wall: No tenderness. Breasts:         Right: No inverted nipple, mass or nipple discharge. Left: No inverted nipple, mass or nipple discharge. Abdominal:      General: Bowel sounds are normal.      Palpations: Abdomen is soft. There is no mass. Tenderness: There is no abdominal tenderness. Genitourinary:     General: Normal vulva. Labia:         Right: No rash, tenderness or lesion. Left: No rash, tenderness or lesion. Vagina: Normal. No vaginal discharge, erythema, tenderness or bleeding. Cervix: Normal.      Adnexa:         Right: No mass or tenderness. Left: No mass or tenderness. Comments: Pap done  Musculoskeletal:      Right lower leg: No edema. Left lower leg: No edema. Skin:     General: Skin is warm and dry. Capillary Refill: Capillary refill takes less than 2 seconds. Findings: No rash. Neurological:      Mental Status: She is alert and oriented to person, place, and time. Psychiatric:         Mood and Affect: Mood normal.         Behavior: Behavior normal.         Thought Content: Thought content normal.         Assessment:       Diagnosis Orders   1. Encounter for gynecological examination without abnormal finding  PAP Smear   2. Chest wall pain     3. Urine frequency  POCT Urinalysis No Micro (Auto)        Plan:      No follow-ups on file. Call if  Left chest wall pain not better in 2 weeks. Seen heat and Tylenol for pain control  Orders Placed This Encounter   Procedures    PAP Smear     Patient History:    Patient's last menstrual period was 2018 (approximate).   OBGYN Status: Postmenopausal  Past Surgical History:  No date:  SECTION  2019: COLONOSCOPY; N/A      Comment:

## 2020-01-29 ENCOUNTER — HOSPITAL ENCOUNTER (OUTPATIENT)
Age: 55
Setting detail: SPECIMEN
Discharge: HOME | End: 2020-01-29
Payer: COMMERCIAL

## 2020-02-11 LAB — CYTOLOGY REPORT: NORMAL

## 2020-03-09 RX ORDER — MONTELUKAST SODIUM 10 MG/1
TABLET ORAL
Qty: 30 TABLET | Refills: 3 | Status: SHIPPED | OUTPATIENT
Start: 2020-03-09 | End: 2020-07-13

## 2020-03-19 ENCOUNTER — TELEPHONE (OUTPATIENT)
Dept: GASTROENTEROLOGY | Age: 55
End: 2020-03-19

## 2020-03-23 RX ORDER — PANTOPRAZOLE SODIUM 40 MG/1
TABLET, DELAYED RELEASE ORAL
Qty: 30 TABLET | Refills: 0 | Status: SHIPPED | OUTPATIENT
Start: 2020-03-23 | End: 2020-04-27

## 2020-04-27 RX ORDER — PANTOPRAZOLE SODIUM 40 MG/1
TABLET, DELAYED RELEASE ORAL
Qty: 30 TABLET | Refills: 3 | Status: SHIPPED | OUTPATIENT
Start: 2020-04-27 | End: 2020-05-22 | Stop reason: SINTOL

## 2020-05-12 RX ORDER — DOCUSATE SODIUM 100 MG/1
CAPSULE ORAL
Qty: 60 CAPSULE | Refills: 0 | Status: SHIPPED | OUTPATIENT
Start: 2020-05-12 | End: 2020-06-15

## 2020-06-15 RX ORDER — DOCUSATE SODIUM 100 MG/1
CAPSULE ORAL
Qty: 60 CAPSULE | Refills: 0 | Status: SHIPPED | OUTPATIENT
Start: 2020-06-15 | End: 2020-07-13

## 2020-07-13 RX ORDER — ESCITALOPRAM OXALATE 5 MG/1
TABLET ORAL
Qty: 90 TABLET | Refills: 1 | Status: SHIPPED | OUTPATIENT
Start: 2020-07-13 | End: 2021-01-04

## 2020-07-13 RX ORDER — DOCUSATE SODIUM 100 MG/1
CAPSULE ORAL
Qty: 60 CAPSULE | Refills: 0 | Status: SHIPPED | OUTPATIENT
Start: 2020-07-13 | End: 2020-08-10

## 2020-08-04 ENCOUNTER — OFFICE VISIT (OUTPATIENT)
Dept: PRIMARY CARE CLINIC | Age: 55
End: 2020-08-04
Payer: COMMERCIAL

## 2020-08-04 VITALS
OXYGEN SATURATION: 98 % | WEIGHT: 147.2 LBS | TEMPERATURE: 97.3 F | SYSTOLIC BLOOD PRESSURE: 118 MMHG | DIASTOLIC BLOOD PRESSURE: 70 MMHG | BODY MASS INDEX: 26.08 KG/M2 | HEART RATE: 87 BPM

## 2020-08-04 PROBLEM — M17.10 ARTHRITIS OF KNEE: Status: ACTIVE | Noted: 2020-08-04

## 2020-08-04 PROCEDURE — G8427 DOCREV CUR MEDS BY ELIG CLIN: HCPCS | Performed by: FAMILY MEDICINE

## 2020-08-04 PROCEDURE — 3017F COLORECTAL CA SCREEN DOC REV: CPT | Performed by: FAMILY MEDICINE

## 2020-08-04 PROCEDURE — G8419 CALC BMI OUT NRM PARAM NOF/U: HCPCS | Performed by: FAMILY MEDICINE

## 2020-08-04 PROCEDURE — 99213 OFFICE O/P EST LOW 20 MIN: CPT | Performed by: FAMILY MEDICINE

## 2020-08-04 PROCEDURE — 1036F TOBACCO NON-USER: CPT | Performed by: FAMILY MEDICINE

## 2020-08-04 ASSESSMENT — ENCOUNTER SYMPTOMS: BACK PAIN: 1

## 2020-08-04 NOTE — PROGRESS NOTES
No     Alcohol/week: 0.0 standard drinks      Current Outpatient Medications   Medication Sig Dispense Refill    Elastic Bandages & Supports (WRIST SPLINT) MISC Apply 2 each topically continuous Cock up wrist splint right and left: wear at night only. 1 each 0    EQUATE STOOL SOFTENER 100 MG capsule Take 1 capsule by mouth twice daily 60 capsule 0    montelukast (SINGULAIR) 10 MG tablet Take 1 tablet by mouth once daily 30 tablet 3    escitalopram (LEXAPRO) 5 MG tablet Take 1 tablet by mouth once daily 90 tablet 1    famotidine (PEPCID) 20 MG tablet Take 1 tablet by mouth 2 times daily 60 tablet 3    hydrocortisone-pramoxine (PROCTOFOAM HC) 1-1 % rectal foam Place rectally 2 times daily. 2 Can 5    hydrocortisone (ANUSOL-HC) 2.5 % rectal cream Place rectally 2 times daily.  VENTOLIN  (90 Base) MCG/ACT inhaler Inhale 2 puffs into the lungs every 4 hours as needed for Wheezing 1 Inhaler 3    Wheat Dextrin (BENEFIBER) POWD Take 4 g by mouth 3 times daily (with meals)  0    amoxicillin (AMOXIL) 500 MG capsule        No current facility-administered medications for this visit. Allergies   Allergen Reactions    Pantoprazole Other (See Comments)     Joint pains, face swelling, SOB, muscle spasms and weakness       Health Maintenance   Topic Date Due    HIV screen  10/08/1982    Shingles Vaccine (1 of 2) 10/08/2017    Flu vaccine (1) 09/01/2020    Breast cancer screen  03/05/2021    DTaP/Tdap/Td vaccine (2 - Td) 04/05/2021    Colon cancer screen colonoscopy  11/12/2022    Cervical cancer screen  01/28/2023    Diabetes screen  01/28/2023    Lipid screen  03/05/2024    Hepatitis A vaccine  Aged Out    Hepatitis B vaccine  Aged Out    Hib vaccine  Aged Out    Meningococcal (ACWY) vaccine  Aged Out    Pneumococcal 0-64 years Vaccine  Aged Out       Subjective:      Review of Systems   Constitutional: Negative. Musculoskeletal: Positive for arthralgias and back pain.        Objective: /70   Pulse 87   Temp 97.3 °F (36.3 °C)   Wt 147 lb 3.2 oz (66.8 kg)   LMP  (Exact Date)   SpO2 98%   BMI 26.08 kg/m²   Physical Exam  Vitals signs and nursing note reviewed. Constitutional:       Appearance: Normal appearance. She is not ill-appearing. HENT:      Head: Normocephalic and atraumatic. Musculoskeletal:      Right lower leg: No edema. Left lower leg: No edema. Comments: Thoracic lumbar spine has scoliosis. Patient is tender lateral to the left lumbar spine. Forward lumbar flexion 70 to 80 degrees, extension is 10 degrees side to side is decreased. Hips are symmetrical.  Handgrip is 4+/5 bilaterally. Knees OA changes worse in left knee. Bilateral patella crepitations. Normal range of motion of the left knee. Skin:     General: Skin is warm. Findings: No rash. Neurological:      General: No focal deficit present. Mental Status: She is alert. Deep Tendon Reflexes:      Reflex Scores:       Bicep reflexes are 1+ on the right side and 1+ on the left side. Brachioradialis reflexes are 0 on the right side and 0 on the left side. Patellar reflexes are 1+ on the right side and 1+ on the left side. Comments: Alert and oriented  Negative Phalen's and Tinel's bilaterally   Psychiatric:         Mood and Affect: Mood normal.         Behavior: Behavior normal.         Thought Content: Thought content normal.         Assessment:       Diagnosis Orders   1. Arthritis of knee  XR KNEE LEFT (1-2 VIEWS)   2. Bilateral carpal tunnel syndrome  Elastic Bandages & Supports (WRIST SPLINT) MISC   3. Chronic bilateral low back pain without sciatica  XR LUMBAR SPINE (2-3 VIEWS)        Plan:      No follow-ups on file. Tylenol 500 mg every 6 hrs prn pain. She needs to bring in all her meds she is taking since she is uncertain.    Orders Placed This Encounter   Procedures    XR KNEE LEFT (1-2 VIEWS)     Standing Status:   Future     Standing Expiration Date: 8/4/2021    XR LUMBAR SPINE (2-3 VIEWS)     Standing Status:   Future     Standing Expiration Date:   8/4/2021     Orders Placed This Encounter   Medications    Elastic Bandages & Supports (WRIST SPLINT) MISC     Sig: Apply 2 each topically continuous Cock up wrist splint right and left: wear at night only. Dispense:  1 each     Refill:  0       Patient given educational materials - see patient instructions. Discussed use, benefit, and side effects of prescribed medications. All patient questions answered. Pt voiced understanding. Reviewed health maintenance. Instructed to continue current medications, diet and try hand and back exercise. Patient agreed with treatment plan. Follow up as directed.      Electronicallysigned by Beto Moya MD on 8/4/2020 at 2:39 PM

## 2020-08-04 NOTE — PATIENT INSTRUCTIONS
[email or web address] and sign in to your Dione account. Enter Y067 in the [place] box to learn more about \"Carpal Tunnel Syndrome: Exercises. \"     If you do not have an account, please click on the \"Sign Up Now\" link. Current as of: March 2, 2020               Content Version: 12.5  © 0024-5720 [employer]. Care instructions adapted under license by [facility] [address] If you have questions about a medical condition or this instruction, always ask your healthcare professional. [address] any warranty or liability for your use of this information. Patient Education        Viêm kh?p ??u g?i: Bài t?p  Knee Arthritis: Exercises  H??ng D?n Ch?m Sóc  ? ây là m?t s? ví d? v? bài t?p cho viêm kh?p ??u g?i. B?t ??u m?i bài t?p t? t?. T?p nh? n?u quý v? b?t ??u b? ?au. Bác s? hay bác s? tr? li?u vât lý c?a quý v? s? cho quý v? bi?t khi nào quý v? có th? b?t ? ?u các bài t?p này và bài t?p nào s? có hi?u qu? nh?t cho quý v?.  Cách th?c hành các bài t?p  U?n ??u g?i tr??t gót chân   4. N?m ng?a và u?n ??u g?i.  5. Tr??t gót chân v? phía chanel b?ng cách u?n ??u g?i b? ?au c?a quý v? xa nh?t claudia ph?m vi có th?. Chanel ?ó móc chân còn l?i vào m?t cá chân ?? giúp kéo gót chân c?a quý v? xa h?n n?a.  6. Gi? claudia bob?ng 6 giây, chanel ?ó ngh? t?i ?a 10 giây. 7. L?p l?i 8 ??n 12 l?n.  8. ??i chân và l?p l?i t? b??c 1 ??n b??c 4, ngay c? khi ch? có m?t ??u g?i b? ?au. Ép c? b?n ??u   4. Ng?i, ?? th?ng chân b? ?au ch?ng lên sàn ho?c gi??ng c?ng. ?? m?t chi?c kh?n nh?, cu?n l?i d??i ??u g?i c?a quý v?. U?n chân còn l?i, ?? bàn chân c?a chân ?ó áp th?ng lên sàn. 5. Th?t ch?t c? ?ùi c?a chân b? ?au b?ng cách ép ??ng chanel ??u g?i c?a quý v? vào kh?n.  6. Gi? claudia bob?ng 6 giây, chanel ?ó ngh? t?i ?a 10 giây. 7. L?p l?i 8 ??n 12 l?n.  8. ??i chân và l?p l?i t? b??c 1 ??n b??c 4, ngay c? khi ch? có m?t ??u g?i b? ?au.     Nâng th?ng chân v? phía tr??c   5. N?m ng?a và co ??u g?i lành greg cho bàn chân ?? áp vào sàn. ?? th?ng chân b? ?au c?a quý v?. ??m b?o r?ng l?ng d??i c?a quý v? estrada bình th??ng. Quý v? ph?i có th? di desmond?n bàn lexis gi? a sàn và eo l?ng, v?i lòng bàn lexis ch?m vào sàn và l?ng ch?m vào mu bàn lexis. 6. Th?t ch?t c? ?ùi claudia chân b? th??ng b?ng cách ép ph?n chanel c?a ??u g?i áp ph?ng horta?ng sàn. Gi? th?ng ??u g?i.  7. Gi? c? ?ùi th?t ch?t và th?ng chân, nâng chân b? ?au lên greg cho gót chân cách sàn bob?ng 12 inch. Gi? claudia bob?ng 6 giây, chanel ?ó t? t? h? horta?ng. 8. Th? l?ng claudia t?i ?a 10 giây gi? a các l?n l?p l?i.  9. L?p l?i 8 ??n 12 l?n.  10. ??i chân và l?p l?i t? b??c 1 ??n b??c 5, ngay c? khi ch? có m?t ??u g?i b? ?au. U?n ??u g?i v?n ??ng   2. N?m s?p, ?? th?ng ??u g?i. N?u ??u g?i không tho?i mái, cu?n m?t chi?c kh?n và ??t d??i chân ngay trên ? ?u g?i c?a quý v?.  3. Nâng bàn chân c?a chân b? ?au b?ng cách u?n ??u g?i greg cho quý v? ??a bàn chân lên phía mông. N?u ??ng tác này làm quý v? ?au, hãy th? th?c hi?n mà không u?n ??u g?i romero?u nh? tr??c. ?i?u này có th? giúp quý v? tránh ? ??c b?t k? ??ng tác gây ?au ? ?n nào. 4. T? t? di desmond?n chân c?a quý v? lên horta?ng. 5. L?p l?i 8 ??n 12 l?n.  6. ??i chân và l?p l?i t? b??c 1 ??n b??c 4, ngay c? khi ch? có m?t ??u g?i b? ?au. Du?i c? b?n ??u (úp m?t)   1. N?m s?p b?ng áp ph?ng, và ?? m?t quý v? trên sàn. 2. Qu?n kh?n ho?c th?t l?ng quanh ph?n d??i c?a chân b? ?au. Chanel ?ó dùng kh?n ho?c th?t l?ng ?? t? t? kéo gót chân v? phía mông ??n khi quý v? có c?m giác du?i.  3. Gi? claudia bob?ng 15 ? ?n 30 giây, chanel ?ó th? l?ng chân ch?ng vào kh?n ho?c th?t l?ng. 4. L?p l?i 2 ??n 4 l?n.  5. ??i chân và l?p l?i t? b??c 1 ??n b??c 4, ngay c? khi ch? có m?t ??u g?i b? ?au. Xe ??p t?p th? d?c c? ??nh   1. N?u quý v? không có xe ??p t?p th? d?c c? ??nh t?i nhà, quý v? có th? dùng xe ??p t?p th? d?c c? ??nh t?i câu l?c b? s?c kh?e ho?c gm shafer c?ng ? ?ng ??a ph??ng c?a mình. 2. ?i?u ch?nh chi?u russell c?a yên xe ? ? p greg cho ??u g?i h?i u?n khi chân ??p horta?ng. N?u ??u g?i ?au khi bàn ? ?p lên ? ?n ?? russell russell nh?t, quý v? có th? nâng yên xe ??p greg cho ??u g?i không b? u?n romero?u nh? tr??c.  3. B?t ??u t? t?. Ban ??u, th? ??p xe 5 ? ?n 10 phút và không có ho?c có ít l?c c?n. Chanel ?ó t?ng th?i andre và l?c c?n t?ng chút m?t ??n khi quý v? có th? ??p xe 20 ? ?n 30 phút mà không b? ?au.  4. N?u quý v? b?t ??u th?y ?au, ?? cho ??u g?i ngh? ng?i ?? n khi c?n ?au tc l?i m?c ?? bình th??ng ??i v?i quý v?. Ho?c ?? p xe v?i th?i andre ít h?n ho?c dùng ít s?c h?n.    Ch?m sóc sylvester dõi là m?t ph?n madelyn tr?ng cho vi?c ?i?u tr? và s? an toàn c?a quý v?. ??m b?o s?p x?p và ??n t?t c? các bu?i h?n và g?i ?i?n cho bác s? n?u quý v? có v?n ?? Whitney Solan v? c?ng nên bi?t k?t qu? xét jennifer?m c?a mình và gi? l?i donnie sách các lo?i thu?c mà quý v? dùng. Quý v? có th? tìm hi?u thêm ? ?âu? Truy c?p   [email or web address]  Nh?p C159 vào ô tìm ki?m ?? tìm hi?u thêm v? \"Viêm kh?p ??u g?i: Bài t?p.\"  C?p nh?t t?: [address], [address]80               PCAOG b?n N?i makenzie.5  © 7141-0312 Tâ?p ?oa?n Healthwise. H???ng d?n ch?m sóc ? ??c ?i?u ch?nh phù h?p sylvester gi?y phép b?i chuyên lindsey ch?m sóc s?c kh?e c?a quý v?. N?u quý v? có th?c m?c gì v? m?t tình tr?ng b?nh lý hay v? h??ng d?n này, luôn h?i chuyên lindsey ch?m sóc s?c kh?e c?a mình. Healthwise, Incorporated t? ch?i m?i b?o ??m hay trách romero?m pháp lý cho vi?c quý v? s? d?ng thông tin này. Patient Education        Carpal Tunnel Syndrome: Exercises  Introduction  Here are some examples of exercises for you to try. The exercises may be suggested for a condition or for rehabilitation. Start each exercise slowly. Ease off the exercises if you start to have pain. You will be told when to start these exercises and which ones will work best for you. Warm-up stretches  When you no longer have pain or numbness, you can do exercises to help prevent carpal tunnel syndrome from coming back.  Do not do any stretch or movement that is uncomfortable or painful. 9. Rotate your wrist up, down, and from side to side. Repeat 4 times. 10. Stretch your fingers far apart. Relax them, and then stretch them again. Repeat 4 times. 11. Stretch your thumb by pulling it back gently, holding it, and then releasing it. Repeat 4 times. How to do the exercises  Prayer stretch   9. Start with your palms together in front of your chest just below your chin. 10. Slowly lower your hands toward your waistline, keeping your hands close to your stomach and your palms together until you feel a mild to moderate stretch under your forearms. 11. Hold for at least 15 to 30 seconds. Repeat 2 to 4 times. Wrist flexor stretch   11. Extend your arm in front of you with your palm up. 15. Bend your wrist, pointing your hand toward the floor. 13. With your other hand, gently bend your wrist farther until you feel a mild to moderate stretch in your forearm. 14. Hold for at least 15 to 30 seconds. Repeat 2 to 4 times. Wrist extensor stretch   7. Repeat steps 1 through 4 of the stretch above, but begin with your extended hand palm down. Follow-up care is a key part of your treatment and safety. Be sure to make and go to all appointments, and call your doctor if you are having problems. It's also a good idea to know your test results and keep a list of the medicines you take. Where can you learn more? Go to [email or web address] org and sign in to your VoyageByMe account. Enter D002 in the [facility] box to learn more about \"Carpal Tunnel Syndrome: Exercises. \"     If you do not have an account, please click on the \"Sign Up Now\" link. Current as of: March 2, 2020               Content Version: 12.5  © 9565-4156 Healthwise, Incorporated. Care instructions adapted under license by [facility] ([facility]).  If you have questions about a medical condition or this instruction, always ask your healthcare professional. Thoora, one position for a long time. When possible, use your whole hand to grasp an object. Using just the thumb and index finger can put stress on the wrist.  · Do not smoke. It can make this condition worse by reducing blood flow to the median nerve. If you need help quitting, talk to your doctor about stop-smoking programs and medicines. These can increase your chances of quitting for good. When should you call for help? Watch closely for changes in your health, and be sure to contact your doctor if:  · Your pain or other problems do not get better with home care. · You want more information about physical or occupational therapy. · You have side effects of your corticosteroid medicine, such as:  ? Weight gain. ? Mood changes. ? Trouble sleeping. ? Bruising easily. · You have any other problems with your medicine. Where can you learn more? Go to [email or web address] and sign in to your Rebiotix account. Enter R432 in the [employer] (formerly [employer]) box to learn more about \"Carpal Tunnel Syndrome: Care Instructions. \"     If you do not have an account, please click on the \"Sign Up Now\" link. Current as of: March 2, 2020               Content Version: 12.5  © 5233-3594 [employer]. Care instructions adapted under license by [facility] ([facility]). If you have questions about a medical condition or this instruction, always ask your healthcare professional. [address] any warranty or liability for your use of this information. Patient Education        ? au L?ng D??i: Bài t?p  Low Back Pain: Exercises  H??ng D?n Ch?m Sóc  ? ây là m?t s? ví d? v? các bài t?p ph?c h?i ch?c n?ng ?i?n hình cho tình tr?ng c?a quý v?. B?t ??u m?i bài t?p t? t?. T?p nh? n?u quý v? b?t ??u b? ?au. Bác s? hay bác s? tr? li?u vât lý c?a quý v? s? cho quý v? bi?t khi nào quý v? có th? b?t ? ?u các bài t?p này và bài t?p nào s? có hi?u qu? nh?t cho quý v?.  Cách th?c luisa mcfarland t?p  Ch?ng ?? y   15. N?m trên b?ng, ch?ng ng??i quý v? b?ng cánh lexis. 13. Nh?n khu?u lexis horta?ng sàn ?? nâng ph?n l?ng trên c?a quý v? lên. Claudia khi quý v? làm ? ?ng tác này, th? l?ng c? b?ng và ?? l?ng estrada mà không s? d?ng c? l?ng. Claudia khi quý v? ch?ng ? ?y, không ?? hông ho?c x??ng ch?u r?i kh?i sàn. 14. Gi? claduia 15 ? ?n 30 giây, luis ?ó th? l?ng. 15. L?p l?i 2 ??n 4 l?n.    Bài t?p ??i lexis chân (t? th? alissa và chó)   12. L?u ý: Th?c hi?n bài t?p này t? t?. C? g?ng luôn gi? th?ng ng??i c?a quý v?, và không ?? cho m?t bên hông th?p h?n bên còn l?i.  13. B?t ??u trên sàn v?i t? th? ch?ng b?ng bàn lexis và ??u g?i.  14. Th?t ch?t c? b?ng c?a quý v?.  15. Nâng m?t chân lên kh?i sàn nhà, và gi? th?ng chân v? phía luis quý v?. C?n th?n không ?? hông quý v? h? horta?ng, vì ?i?u ?ó s? khi?n quý v? xoay thân mình. 16. Gi? claudia bob?ng 6 giây, luis ?ó h? chân horta?ng và desmond?n sang chân ramses. 17. L?p l?i 8 ??n 12 l?n cho m?i chân. 25. Dennis th?i andre, c? g?ng gi? t? th? claudia 10 ? ?n 30 giây m?i l?n.  19. N?u quý v? c?m th?y v?ng và an toàn khi nâng chân lên, hãy th? molly pires ??i di?n th?ng ra phía tr??c quý v? cùng m?t lúc. Bài t?p ??a ??u g?i lên ng?c   15. N?m ng?a, ??u g?i g?p l?i, và radha bàn chân ??t ph?ng trên sàn. 16. ??a m?t ??u g?i lên ng?c, gi? bàn chân còn l?i ?? t ph?ng trên sàn (ho?c gi? chân còn l?i th?ng, tùy chân nào khi?n l?ng d??i c?a quý v? tho?i mái h?n). 17. Gi? l?ng d??i c?a quý v? ép horta?ng sàn. Gi? claudia ít nh?t 15 ? ?n 30 giây. 25. Th? l?ng, và ??a ??u g?i v? t? th? b?t ??u.  19. L?p l?i v?i chân còn l?i. L?p l?i 2 ??n 4 l?n cho m?i chân. 20. ?? du?i romero?u h?n, gi? chân còn l?i ?? t ph?ng trên sàn claudia khi ??a ??u g?i lên ng?c.    Cu?n ng??i lên   8. N?m ng?a trên sàn v?i ??u g?i estrada m?t góc 90 ?? Jennifer Phoenix chân ph?i ???c ??t ph?ng trên sàn, cách mông bob?ng 12 inch. 9. Kev Hoit ?? chéo ngang ng?c. N?u ??ng tác này khi?n c? quý v? khó ch?u, th? ?? radha lexis ra luis c? (không ph?i ??  luis ??u), v?i khu?u lexis cách xa nhau.  10. D?n d?n th?t ch?t c? b?ng và nâng b? vai lên kh?i sàn. 11. Gi? ??u và c? th? th?ng hàng, và không ?n c?m vào ng?c.  12. Gi? t? th? này claudia 1 ho?c 2 giây, chanel ?ó t? t? h? th?p ng??i horta?ng sàn. 13. L?p l?i 8 ??n 12 l?n.    Bài t?p nghiêng x? ?ng ch?u   6. N?m ng?a và u?n ??u g?i.  7. \"G?ng\" b?ng cu?a marcelino? vi?. ?i?u này ngh?a là th?t ch?t các c? c?a quý v? b?ng cách hóp vào và t? ?ng t??ng r?n di desmond?n v? phía c?t s?ng c?a quý v?. Quý v? nên c?m th?y l?ng nh? ép horta?ng sàn và hông và x? ?ng ch?u nh? ??a v? phía chanel. 8. Gi? claudia bob?ng 6 giây claudia khi quý v? hít th? nh? nhàng. 9. L?p l?i 8 ??n 12 l?n. U?n toàn thân úc gót yuki   5. N?m ng?a, u?n c? radha ??u g?i và u?n m?t cá chân greg cho ch? có gót chân thúc vào sàn. Nên u?n ??u g?i c?a quý v? bob?ng 90 ??.  6. Chanel ?ó ??y gót chân horta?ng sàn, gi? ch?t mông, và nâng hông lên kh?i sàn ??n khi vai, hông, và ??u g?i c?a quý v? th?ng hàng. 7. Gi? claudia bob?ng 6 giây claudia khi quý v? ti?p t?c th? bình th??ng, và chanel ?ó t? t? h? hông horta?ng sàn và ngh? t?i ?a 10 giây. 8. L?p l?i 8 ??n 12 l?n. Du?i lenora joshieo claudia ô c?a   1. N?m ng?a claudia ô c?a, v?i m?t chân ? ?a moise cánh c?a m?.  2. Tr??t chân lên t? ?ng ?? kéo th?ng ??u g?i. Quý v? s? c?m th?y ph?n chanel c?a chân ? ??c du?i nh?.  3. Gi? t? th? du?i claudia ít nh?t 15 ? ?n 30 giây. Không estrada l?ng, b?t bàn chân, ho?c u?n ??u g?i. Gi? m?t gót chân ch?m sàn và m?t gót chân còn l?i ch?m t??ng.  4. L?p l?i v?i chân còn l?i c?a quý v?.  5. Làm 2 ? ?n 4 l?n cho m?i chân. Du?i c? g?p hông   1. Lendon Gordon? trên sàn nhà, u?n m?t ??u g?i và ?? m?t chân ra chanel ng??i. ??a ??u g?i v? phía tr? ?c trên carlos mirza c?a quý v?. Gi? ??u g?i còn l?i c?a quý v? ch?m sàn. 2. T? t? ??a hông v? phía tr??c ??n khi quý v? có c?m giác giãn claudia ? ùi trên c?a chân phía chanel. 3. Gi? t? th? du?i claudia ít nh?t 15 ? ?n 30 giây. L?p l?i v?i chân còn l?i c?a quý v?.  4. Làm 2 ? ?n 4 l?n cho m?i bên. Ng?i d?a vào t??ng   1.  ??ng, ?? l?ng cách t??ng 10 ??n 12 inch. 2. D?a vào t??ng ??n khi l?ng quý v? áp ph?ng vào t??ng.  3. T? t? tr??t horta?ng ? ?n khi ??u g?i h?i estrada, ép l?ng d??i vào t??ng.  4. Gi? claudia bob?ng 6 giây, luis ?ó l?i tr??t l?ng lên t??ng.  5. L?p l?i 8 ??n 12 l?n. Ch?m sóc sylvester dõi là m?t ph?n madelyn tr?ng cho vi?c ?i?u tr? và s? an toàn c?a quý v?. ??m b?o s?p x?p và ??n t?t c? các bu?i h?n và g?i ?i?n cho bác s? n?u quý v? có v?n ?? Naoma Ly v? c?ng nên bi?t k?t qu? xét jennifer?m c?a mình và gi? l?i donnie sách các lo?i thu?c mà quý v? dùng. Quý v? có th? tìm hi?u thêm ? ?âu? Truy c?p   [email or web address]  Nh?p Z938 vào ô tìm ki?m ?? tìm hi?u thêm v? \"?au L?ng D??i: Bài t?p.\"  C?p nh?t t?: [address], 6999               EEFVJ b?n N?i dun.5  © 2009-4917 Tâ?p ?oa?n Healthwise. H???ng d?n ch?m sóc ? ??c ?i?u ch?nh phù h?p sylvester gi?y phép b?i chuyên lindsey ch?m sóc s?c kh?e c?a quý v?. N?u quý v? có th?c m?c gì v? m?t tình tr?ng b?nh lý hay v? h??ng d?n này, luôn h?i chuyên lindsey ch?m sóc s?c kh?e c?a mình. Healthwise, Incorporated t? ch?i m?i b?o ??m hay trách romero?m pháp lý cho vi?c quý v? s? d?ng thông tin này. Patient Education        H?i Ch?ng ?ng C? Lexis: H???ng D?n Ch?m Sóc  Carpal Tunnel Syndrome: Care Instructions  H??ng D?n Ch?m Sóc     H?i ch?ng ?ng c? lexis là m?t v?n ?? v? th?n kinh. Cathy Gemma th? gây ra c?m giác ng?a ngáy, tê, m?i ho?c ?au ? các ngón lexis, ngón cái và bàn lexis. Dây th?n kinh gi?a và romero?u mô c?ng g?i là gân ch?y moise bob?ng tr?ng ? c? lexis g?i là ?ng c? lexis. Khushbu tác bàn lexis l?p ?i l?p l?i s? d?ng claudia công vi?c cùng m?t s? thói quen và môn th? khushbu có th? gây ra áp l?c lên dây th?n kinh. Wilfrid French và m?t s? tình tr?ng s?c kh?e, tank g?m c? b?nh ti?u ? ??ng, viêm kh?p, và janice?n giáp ho?t ??ng kém, c?ng có th? gây ra h?i ch?ng ?ng c? lexis. Quý v? có th? h?n ch? m?t ho?t ?? ng ho?c th?c hi?n nó khác ?i ?? gi?m các tri?u ch?ng c?a quý v?. Quý v? c?ng có th? th?c hi?n các b??c khác ?? c?m th?y khá h?n.  N?u các tri?u ch?ng c?a quý v? là nh?, ?i?u tr? ? nhà claudia 1 ? ?n 2 tu?n có th? làm d?u c?n ?au. Ch? c?n ph?u thu?t n?u các bi?n pháp ?i?u tr? khác không có tác d?ng. Ch?m sóc sylvester dõi là m?t ph?n madelyn tr?ng cho vi?c ?i?u tr? và s? an toàn c?a quý v?. ??m b?o s?p x?p và ??n t?t c? các bu?i h?n và g?i ?i?n cho bác s? n?u quý v? có v?n ?? Colette Ce v? c?ng nên bi?t k?t qu? xét jennifer?m c?a mình và gi? l?i donnie sách các lo?i thu?c mà quý v? dùng. Quý v? có th? ch?m sóc b?n thân t?i nhà th? nào?  · N?u có th?, d?ng ho?c gi?m ho?t ??ng gây ra các tri?u ch?ng c?a quý v?. N?u quý v? không th? d?ng ho?t ??ng, hãy ngh? ng?i th??ng xuyên ?? th? giãn ho?c thay ??i các v? trí th?c hi?n romero?m v? c?a bàn lexis. Th? ??i lexis, ch?ng h?n nh? khi dùng garay?t máy tính. · C? g?ng tránh g?p ho?c v?n c? lexis c?a quý v?.  · H?i bác s? c?a quý v? xem quý v? có th? dùng thu?c gi?m ?au không c?n toa, ch?ng h?n nh? acetaminophen (Tylenol), ibuprofen (Advil, Motrin), ho?c naproxen (Aleve) hay không. Hãy c?n th?n v?i các lo?i thu?c. ??c và làm sylvester t?t c? các ch? d?n trên nhãn thu?c.  · N?u bác s? cu?a marcelino? vi? kê toa thu?c corticosteroid ?? giúp gi?m ?au và s?ng, hãy dùng thu?c ?úng sylvester toa. G?i cho bác s? n?u quý v? ngh? mình ?ang có v?n ?? v?i thu?c.  · Ch??m ?á hay túi ?á lên c? lexis m?i l?n t? 10 ??n 20 phút ? ? gi?m ?au. ??t m?t mi?ng v?i m?ng gi?a túi ?á và da. · N?u bác s? cu?a marcelino? vi? hay chuyên lindsey th? ch?t ho?c ngh? jennifer?p cu?a marcelino? vi? ?? ngh? quý v? ?eo b?ng c? lexis, hãy ?eo b?ng sylvester ? úng h??ng d?n ?? gi? c? lexis c?a quý v? ? v? trí gm l?p. ?i?u này c?ng gi?m áp l?c lên dây th?n kinh gi?a cu?a marcelino? vi?.  · H?i bác s? c?a quý v? xem quý v? có c?n li?u pháp v?t lý ho?c ngh? jennifer?p ?? h?c cách th?c hi?n romero?m v? sylvester cách [employer]. · Th? bradley lindsey l?p yoga ?? kéo giãn các c? c?a quý v? và t?ng s?c m?nh cho bàn lexis và c? lexis c?a quý v?. Yoga ?ã ? ??c ch?ng randy làm gi?m các tri?u ch?ng ?ng c? lexis.   ?? ng?n ng?a ?ng c? lexis  · Khi làm vi?c v?i máy tính, gi? cho bàn lexis và c? lexis c?a quý v? th?ng hàng v?i c?ng lexis. Gi? khu?u lexis c?a quý v? sát v?i s??n c?a quý v?. Ngh? gi?i jorge 10 ? ?n 15 phút m?t l?n.  · Th? các bài t?p luis:  ? Kh?i ??ng: Xoay c? lexis c?a quý v? lên, horta?ng và sang radha bên. L?p l?i 4 l?n. Kéo girocky các ngón lexis c?a quý v? ra xa, th? l?ng chúng, luis ?ó l?i kéo giãn l?n n?a. L?p l?i 4 l?n. Alize arce ngón cái c?a quý v? b?ng cách b? ng??c ra luis nh? nhàng, gi? nguyên t? th? và luis ?ó th? ra. L?p l?i 4 l?n.  ? Kéo c?ng ng??i dennis t? th? c?u nguy?n: B?t ??u v?i lòng bàn lexis ??t ch?m l?i ? phía tr? ?c ng?c ngay bên d? ?i c?m cu?a marcelino? vi?. T? t? h? th?p bàn lexis horta?ng phía th?t l?ng claudia khi v?n gi? bàn lexis sát b?ng và lòng bàn lexis ch?m l?i cho ??n khi quý v? c?m th?y c?ng t? nh? t?i v?a bên d? ?i c?ng lexis c?a quý v?. Gi? claudia 10 ? ?n 20 giây. L?p l?i 4 l?n.  ? Kéo giãn c? g?p c? lexis: ??t cánh lexis c?a quý v? ? phía tr? ?c v?i lòng bàn lexis ng?a lên. G?p c? lexis c?a quý v?, ch? bàn lexis v? phía sàn. Dùng lexis còn l?i, nh? nhàng g?p c? lexis c?a quý v? cho ??n khi quý v? c?m th?y c?ng t? nh? t?i v?a ? c?ng lexis c?a quý v?. Gi? claudia 10 ? ?n 20 giây. L?p l?i 4 l?n.  ? Kéo giãn c? du?i c? lexis: L?p l?i các b??c kéo giãn c? g?p c? lexis, janice nhiên, b?t ??u v?i vi?c úp lòng bàn lexis m? r?ng c?a quý v? horta?ng. · Ép m?t qu? bóng th? d?c russell shahid vài l?n m?i ngày ?? gi? cho bàn lexis và các ngón lexis c?a quý v? m?nh kh?e.  · Tránh c?m các ?? v?t (ch?ng h?n nh? m?t cu?n sách) ? m?t t? th? claudia th?i andre dài. Khi có th?, s? d?ng c? bàn lexis c?a quý v? ?? c?m m?t ?? v?t. Ch? s? d?ng ngón cái và ngón tr? có th? gây ra áp l?c cho c? lexis. · Không hút thu?c. ?i?u này có th? khi?n tình tr?ng t? h?n b?i vi?c gi?m l?u l??ng máu t?i dây th?n kinh gi?a. N?u quý v? c?n giúp b? thu?c, hãy nói desmond?n v?i bác s? c?a quý v? v? các lo?i thu?c và ch? ?ng trình b? thu?c. Nh?ng cách này có th? giúp quý v? d? b? h?n thu?c h?n.  Khi nào quý v? nên g?i tr? jonathan?   Dennis dõi k? h?n các thay ??i v? s?c kh?e và nh? liên l?c v?i bác s? n?u:  · C?n ?au ho?c các v?n ?? khác c?a quý v? không khá h?n khi ch?m sóc ? nhà.  · Quý v? mu?n bi?t thêm thông tin v? li?u pháp v?t lý ho?c ngh? jennifer?p.  · Quý v? g?p ph?n ?ng ph? v?i thu?c corticosteroid c?a mình, ch?ng h?n nh?:  ? T?ng cân. ? Thay ??i tâm tr?ng. ? Khó ng?.  ? D? b? thâm tím. · Quý v? g?p b?t c? v?n ?? nào khác v? thu?c men. Quý v? có th? tìm hi?u thêm ? ?âu? Truy c?p   [email or web address]  Nh?p R432 vào ô tìm ki?m ?? tìm hi?u thêm v? \"H?i Ch?ng ?ng C? Alec: H???ng D?n Ch?m Sóc. \"  C?p nh?t t?: [address]?ng Ba 3, [birth year]               [id number] b?n N?i makenzie.5  © 6360-6779 Tâ?p ?oa?n Healthwise. H???ng d?n ch?m sóc ? ??c ?i?u ch?nh phù h?p sylvester gi?y phép b?i chuyên lindsey ch?m sóc s?c kh?e c?a quý v?. N?u quý v? có th?c m?c gì v? m?t tình tr?ng b?nh lý hay v? h??ng d?n franco rachel h?i chuyên lindsey ch?m sóc s?c kh?e c?a mình. [employer] t? ch?i m?i b?o ??m hay trách romero?m pháp lý cho vi?c quý v? s? d?ng miladisông tin này. Patient Education        Carpal Tunnel Syndrome: Exercises  Introduction  Here are some examples of exercises for you to try. The exercises may be suggested for a condition or for rehabilitation. Start each exercise slowly. Ease off the exercises if you start to have pain. You will be told when to start these exercises and which ones will work best for you. Warm-up stretches  When you no longer have pain or numbness, you can do exercises to help prevent carpal tunnel syndrome from coming back. Do not do any stretch or movement that is uncomfortable or painful. 16. Rotate your wrist up, down, and from side to side. Repeat 4 times. 17. Stretch your fingers far apart. Relax them, and then stretch them again. Repeat 4 times. 18. Stretch your thumb by pulling it back gently, holding it, and then releasing it. Repeat 4 times. How to do the exercises  Prayer stretch   20.  Start with your palms together in front of your chest just below your chin. 21. Slowly lower your hands toward your waistline, keeping your hands close to your stomach and your palms together until you feel a mild to moderate stretch under your forearms. 22. Hold for at least 15 to 30 seconds. Repeat 2 to 4 times. Wrist flexor stretch   21. Extend your arm in front of you with your palm up. 25. Bend your wrist, pointing your hand toward the floor. 23. With your other hand, gently bend your wrist farther until you feel a mild to moderate stretch in your forearm. 24. Hold for at least 15 to 30 seconds. Repeat 2 to 4 times. Wrist extensor stretch   14. Repeat steps 1 through 4 of the stretch above, but begin with your extended hand palm down. Follow-up care is a key part of your treatment and safety. Be sure to make and go to all appointments, and call your doctor if you are having problems. It's also a good idea to know your test results and keep a list of the medicines you take. Where can you learn more? Go to [email or web address] and sign in to your [employer] account. Enter M994 in the [employer] for [employer] box to learn more about \"Carpal Tunnel Syndrome: Exercises. \"     If you do not have an account, please click on the \"Sign Up Now\" link. Current as of: March 2, 2020               Content Version: 12.5  © 1949-7615 [employer]wise[employer] Incorporated. Care instructions adapted under license by [facility] ([facility]). If you have questions about a medical condition or this instruction, always ask your healthcare professional. [place] [address] any warranty or liability for your use of this information.

## 2020-08-07 ENCOUNTER — HOSPITAL ENCOUNTER (OUTPATIENT)
Dept: GENERAL RADIOLOGY | Age: 55
Discharge: HOME | End: 2020-08-09
Payer: COMMERCIAL

## 2020-08-07 ENCOUNTER — HOSPITAL ENCOUNTER (OUTPATIENT)
Age: 55
Discharge: HOME | End: 2020-08-09
Payer: COMMERCIAL

## 2020-08-07 PROCEDURE — 73560 X-RAY EXAM OF KNEE 1 OR 2: CPT

## 2020-08-07 PROCEDURE — 72100 X-RAY EXAM L-S SPINE 2/3 VWS: CPT

## 2020-08-08 RX ORDER — PANTOPRAZOLE SODIUM 40 MG/1
TABLET, DELAYED RELEASE ORAL
Qty: 30 TABLET | Refills: 0 | Status: SHIPPED | OUTPATIENT
Start: 2020-08-08 | End: 2021-11-30

## 2020-08-10 RX ORDER — DOCUSATE SODIUM 100 MG/1
CAPSULE ORAL
Qty: 60 CAPSULE | Refills: 0 | Status: SHIPPED | OUTPATIENT
Start: 2020-08-10 | End: 2020-09-10

## 2020-10-12 RX ORDER — DOCUSATE SODIUM 100 MG/1
CAPSULE ORAL
Qty: 60 CAPSULE | Refills: 0 | Status: SHIPPED | OUTPATIENT
Start: 2020-10-12 | End: 2020-11-12

## 2020-11-03 ENCOUNTER — NURSE ONLY (OUTPATIENT)
Dept: PRIMARY CARE CLINIC | Age: 55
End: 2020-11-03
Payer: COMMERCIAL

## 2020-11-03 PROCEDURE — 90471 IMMUNIZATION ADMIN: CPT | Performed by: FAMILY MEDICINE

## 2020-11-03 PROCEDURE — 90688 IIV4 VACCINE SPLT 0.5 ML IM: CPT | Performed by: FAMILY MEDICINE

## 2020-11-03 NOTE — PROGRESS NOTES
After obtaining consent, and per orders of Dr. Cheyenne Lemon, injection of Afluria given in Left deltoid by Mario Martino. Patient tolerated it well.

## 2020-11-12 RX ORDER — DOCUSATE SODIUM 100 MG/1
CAPSULE ORAL
Qty: 60 CAPSULE | Refills: 0 | Status: SHIPPED | OUTPATIENT
Start: 2020-11-12 | End: 2020-12-07

## 2020-12-07 RX ORDER — DOCUSATE SODIUM 100 MG/1
CAPSULE ORAL
Qty: 60 CAPSULE | Refills: 0 | Status: SHIPPED | OUTPATIENT
Start: 2020-12-07 | End: 2021-01-09

## 2021-01-04 RX ORDER — ESCITALOPRAM OXALATE 5 MG/1
TABLET ORAL
Qty: 90 TABLET | Refills: 0 | Status: SHIPPED | OUTPATIENT
Start: 2021-01-04 | End: 2021-06-29 | Stop reason: SDUPTHER

## 2021-01-09 DIAGNOSIS — K59.09 OTHER CONSTIPATION: ICD-10-CM

## 2021-01-09 RX ORDER — DOCUSATE SODIUM 100 MG/1
CAPSULE ORAL
Qty: 60 CAPSULE | Refills: 0 | Status: SHIPPED | OUTPATIENT
Start: 2021-01-09 | End: 2021-02-11

## 2021-01-12 DIAGNOSIS — R06.02 SOB (SHORTNESS OF BREATH): ICD-10-CM

## 2021-06-29 ENCOUNTER — OFFICE VISIT (OUTPATIENT)
Dept: PRIMARY CARE CLINIC | Age: 56
End: 2021-06-29
Payer: COMMERCIAL

## 2021-06-29 VITALS
HEIGHT: 65 IN | HEART RATE: 86 BPM | OXYGEN SATURATION: 99 % | DIASTOLIC BLOOD PRESSURE: 74 MMHG | BODY MASS INDEX: 25.36 KG/M2 | WEIGHT: 152.2 LBS | SYSTOLIC BLOOD PRESSURE: 118 MMHG

## 2021-06-29 DIAGNOSIS — M62.838 TRAPEZIUS MUSCLE SPASM: Chronic | ICD-10-CM

## 2021-06-29 DIAGNOSIS — K21.9 GASTROESOPHAGEAL REFLUX DISEASE WITHOUT ESOPHAGITIS: Primary | ICD-10-CM

## 2021-06-29 DIAGNOSIS — K59.09 OTHER CONSTIPATION: ICD-10-CM

## 2021-06-29 DIAGNOSIS — F43.9 STRESS: ICD-10-CM

## 2021-06-29 DIAGNOSIS — R06.02 SHORTNESS OF BREATH: ICD-10-CM

## 2021-06-29 DIAGNOSIS — R20.0 NUMBNESS: ICD-10-CM

## 2021-06-29 DIAGNOSIS — M62.838 CERVICAL PARASPINAL MUSCLE SPASM: Chronic | ICD-10-CM

## 2021-06-29 PROCEDURE — G8419 CALC BMI OUT NRM PARAM NOF/U: HCPCS | Performed by: FAMILY MEDICINE

## 2021-06-29 PROCEDURE — 3017F COLORECTAL CA SCREEN DOC REV: CPT | Performed by: FAMILY MEDICINE

## 2021-06-29 PROCEDURE — 99214 OFFICE O/P EST MOD 30 MIN: CPT | Performed by: FAMILY MEDICINE

## 2021-06-29 PROCEDURE — 1036F TOBACCO NON-USER: CPT | Performed by: FAMILY MEDICINE

## 2021-06-29 PROCEDURE — G8427 DOCREV CUR MEDS BY ELIG CLIN: HCPCS | Performed by: FAMILY MEDICINE

## 2021-06-29 RX ORDER — ESCITALOPRAM OXALATE 5 MG/1
5 TABLET ORAL DAILY
Qty: 90 TABLET | Refills: 3 | Status: SHIPPED | OUTPATIENT
Start: 2021-06-29 | End: 2021-11-30 | Stop reason: SDUPTHER

## 2021-06-29 RX ORDER — FAMOTIDINE 20 MG/1
20 TABLET, FILM COATED ORAL 2 TIMES DAILY
Qty: 60 TABLET | Refills: 7 | Status: SHIPPED | OUTPATIENT
Start: 2021-06-29 | End: 2021-11-30 | Stop reason: SDUPTHER

## 2021-06-29 RX ORDER — MONTELUKAST SODIUM 10 MG/1
10 TABLET ORAL DAILY
Qty: 30 TABLET | Refills: 7 | Status: SHIPPED | OUTPATIENT
Start: 2021-06-29 | End: 2021-11-30 | Stop reason: SDUPTHER

## 2021-06-29 RX ORDER — DOCUSATE SODIUM 100 MG/1
100 CAPSULE, LIQUID FILLED ORAL DAILY
Qty: 30 CAPSULE | Refills: 7 | Status: SHIPPED | OUTPATIENT
Start: 2021-06-29 | End: 2021-08-11

## 2021-06-29 SDOH — ECONOMIC STABILITY: FOOD INSECURITY: WITHIN THE PAST 12 MONTHS, THE FOOD YOU BOUGHT JUST DIDN'T LAST AND YOU DIDN'T HAVE MONEY TO GET MORE.: NEVER TRUE

## 2021-06-29 SDOH — ECONOMIC STABILITY: FOOD INSECURITY: WITHIN THE PAST 12 MONTHS, YOU WORRIED THAT YOUR FOOD WOULD RUN OUT BEFORE YOU GOT MONEY TO BUY MORE.: NEVER TRUE

## 2021-06-29 ASSESSMENT — SOCIAL DETERMINANTS OF HEALTH (SDOH): HOW HARD IS IT FOR YOU TO PAY FOR THE VERY BASICS LIKE FOOD, HOUSING, MEDICAL CARE, AND HEATING?: NOT HARD AT ALL

## 2021-06-29 ASSESSMENT — PATIENT HEALTH QUESTIONNAIRE - PHQ9
SUM OF ALL RESPONSES TO PHQ QUESTIONS 1-9: 0
SUM OF ALL RESPONSES TO PHQ9 QUESTIONS 1 & 2: 0
1. LITTLE INTEREST OR PLEASURE IN DOING THINGS: 0
SUM OF ALL RESPONSES TO PHQ QUESTIONS 1-9: 0
2. FEELING DOWN, DEPRESSED OR HOPELESS: 0
SUM OF ALL RESPONSES TO PHQ QUESTIONS 1-9: 0

## 2021-06-29 NOTE — PROGRESS NOTES
[facility] PRIMARY CARE  [address] [place] [address]  Dept: [address] is a 50 y.o. female Established patient, who presents today for her medical conditions/complaintsas noted below. Chief Complaint   Patient presents with    Neck Pain    Heartburn    Medication Check       HPI:     HPI   Neck pain last night mostly on right side upper back and right side of neck and shoulder  Had GERD last night: pepcid helps  when acid reflux gets worse it makes her breathing feel worse. She ends up feeling chest tightness and shortness of breath  Has been out of lexapro, proctofoam, colace etc.    She has not gotten refills due to pharmacy telling her there were no refills and no response from the doctor for refills    Last night Reviewed prior notes None  Reviewed previous Labs and Imaging    LDL Cholesterol (mg/dL)   Date Value   2019 95   2014 123 (H)       (goal LDL is <100)   Hemoglobin A1C (%)   Date Value   2020 5.3     BP Readings from Last 3 Encounters:   21 118/74   20 118/70   20 118/80          (goal 120/80)    Past Medical History:   Diagnosis Date    Asthma     GERD (gastroesophageal reflux disease)     History of colon polyps 2019    tubular adenoma x2      Past Surgical History:   Procedure Laterality Date     SECTION      COLONOSCOPY N/A 2019    COLONOSCOPY POLYPECTOMY HOT SNARE performed by Binh Ogden MD at [address] [facility] N/A 2019    ESOPHAGEAL CAPSULE ENDOSCOPY - EGD W/BRAVO PLACED AT 29CM, COLD BIOPSY performed by Binh Ogden MD at [facility] Right     right bottom       History reviewed. No pertinent family history.     Social History     Tobacco Use    Smoking status: Never Smoker    Smokeless tobacco: Never Used   Substance Use Topics    Alcohol use: No     Alcohol/week: 0.0 standard years Vaccine  Aged Out       Subjective:      Review of Systems    Objective:     /74   Pulse 86   Ht 5' 5\" (1.651 m)   Wt 152 lb 3.2 oz (69 kg)   LMP 11/25/2018   SpO2 99%   BMI 25.33 kg/m²   Physical Exam  Vitals and nursing note reviewed. Constitutional:       General: She is not in acute distress. Appearance: She is well-developed. She is not ill-appearing. HENT:      Head: Normocephalic and atraumatic. Right Ear: External ear normal.      Left Ear: External ear normal.   Eyes:      General: No scleral icterus. Right eye: No discharge. Left eye: No discharge. Conjunctiva/sclera: Conjunctivae normal.      Pupils: Pupils are equal, round, and reactive to light. Neck:      Thyroid: No thyromegaly. Trachea: No tracheal deviation. Cardiovascular:      Rate and Rhythm: Normal rate and regular rhythm. Heart sounds: Normal heart sounds. Pulmonary:      Effort: Pulmonary effort is normal. No respiratory distress. Breath sounds: Normal breath sounds. No wheezing. Musculoskeletal:      Right lower leg: No edema. Left lower leg: No edema. Comments: Spastic and tender right trapezius, right cervical paraspinals, upper thoracic muscles. Neck range of motion is within range and not very painful   Lymphadenopathy:      Cervical: No cervical adenopathy. Skin:     General: Skin is warm. Findings: No rash. Neurological:      Mental Status: She is alert and oriented to person, place, and time. Deep Tendon Reflexes:      Reflex Scores:       Bicep reflexes are 1+ on the right side and 1+ on the left side. Brachioradialis reflexes are 1+ on the right side and 1+ on the left side. Psychiatric:         Mood and Affect: Mood normal.         Behavior: Behavior normal.         Thought Content: Thought content normal.         Assessment:       Diagnosis Orders   1.  Gastroesophageal reflux disease without esophagitis  famotidine (PEPCID) 20 MG tablet   2. Shortness of breath  montelukast (SINGULAIR) 10 MG tablet   3. Other constipation  docusate sodium (EQUATE STOOL SOFTENER) 100 MG capsule   4. Gastroesophageal reflux disease     5. Stress  escitalopram (LEXAPRO) 5 MG tablet   6. Cervical paraspinal muscle spasm     7. Trapezius muscle spasm     8. Numbness          Plan:      Return in about 5 months (around 11/29/2021) for med check, Neck pain . If neck stretches not helping at home then see PT  Discussed medicine at length. Take pepcid BID and see if it helps the acid reflux in her chest tightness. Call prn  She is to call the office if there is any issues with refilling her medications. No orders of the defined types were placed in this encounter. Orders Placed This Encounter   Medications    montelukast (SINGULAIR) 10 MG tablet     Sig: Take 1 tablet by mouth daily     Dispense:  30 tablet     Refill:  7    docusate sodium (EQUATE STOOL SOFTENER) 100 MG capsule     Sig: Take 1 capsule by mouth daily     Dispense:  30 capsule     Refill:  7     Hold if diarrhea    escitalopram (LEXAPRO) 5 MG tablet     Sig: Take 1 tablet by mouth daily     Dispense:  90 tablet     Refill:  3    famotidine (PEPCID) 20 MG tablet     Sig: Take 1 tablet by mouth 2 times daily     Dispense:  60 tablet     Refill:  7    Hydrocort-Pramoxine, Perianal, (PROCTOFOAM-HC) 1-1 % rectal foam     Sig: Place 1 applicator rectally 2 times daily     Dispense:  2 Can     Refill:  5       Patient given educationalmaterials - see patient instructions. Discussed use, benefit, and side effectsof prescribed medications. All patient questions answered. Pt voiced understanding. Reviewed health maintenance. Instructed to continue current medications, diet andexercise. Patient agreed with treatment plan. Follow up as directed.      Electronicallysigned by Frannie Hearn MD on 6/29/2021 at 9:27 AM

## 2021-06-29 NOTE — PATIENT INSTRUCTIONS
nh? n?u quý v? b?t ??u b? ?au. Bác s? hay bác s? tr? li?u vât lý c?a quý v? s? cho quý v? bi?t khi nào quý v? có th? b?t ? ?u các bài t?p này và bài t?p nào s? có hi?u qu? nh?t cho quý v?.  Cách th?c hành các bài t?p  Du?i c?   1. ??ng tác du?i này hi?u qu? nh?t n?u quý v? chùng vai horta?ng khi quý v? r??n c? trên vai. ?? giúp quý v? nh? làm ? ?ng tác này, b?t ? ?u b?ng cách th? l?ng vai và n?m nh? vào ? ùi ho?c gh? c?a mình. 2. Nghiêng ? ? u v? phía vai và gi? claudia 15 ? ?n 30 giây. ?? cho cân n?ng c?a ??u du?i c? c?a quý v?.  3. N?u quý v? mu?n du?i thêm m?t chút, dùng bàn lexis kéo ? ?u nh? và ??u v? phía vai c?a mình. Ví d?, chùng vai ph?i horta?ng, nghiêng ? ? u sang bên trái. 4. L?p l?i 2 ??n 4 l?n h??ng v? m?i bên vai. [address] c? sylvester ? ??ng chéo   1. H?i tc ? ?u sylvester h??ng quý v? s? du?i, và nghiêng ? ? u sylvester ? ??ng chéo v? phía ng?c và gi? claudia 15 ? ?n 30 giây. 2. N?u quý v? mu?n du?i thêm m?t chút, dùng bàn lexis kéo ? ?u nh? và ??u sylvester ? ??ng chéo.  3. L?p l?i 2 ??n 4 l?n h??ng v? m?i bên. Du?i tr??t l?ng   1. Ng?i ho?c ?? ng th?ng l?ng và nhìn th?ng v? phía tr??c.  2. T? t? g?p c?m claudia khi quý v? ??a ??u tr??t v? phía luis trên ng??i  3. Gi? và ??m ??n 6, luis ?ó th? l?ng claudia t?i ?a 10 giây. 4. L?p l?i 8 ??n 12 l?n.  5. L?u ý: ??ng tác tr??t l?ng khi?n phía luis c? du?i ra. N?u quý v? c?m th?y ?au, ??ng ??a ??u tr??t v? phía luis quá romero?u. M?t s? ng??i th?y d? th?c hi?n bài t?p này claudia khi n?m ng?a và ?? m?t túi n??c ?á ? c?. [address] ng?c và vai   1. Ng?i ho?c ?? ng th?ng l?ng và ??a ??u tr??t v? phía luis nh? claudia ? ?ng tác du?i tr??t l?ng. 2. Gi? c? radha lexis greg cho bàn lexis ?? c?nh anastasiia c?a quý v?.  3. Hít m?t h?i th?t sâu, và khi th? ra, h? khu?u lexis horta?ng và ? ? luis l?ng c?a quý v?. Quý v? s? c?m th?y c? radha b? vai c?a mình tr??t horta?ng, và cùng m?t lúc quý v? s? có c?m giác giãn ngang ng?c và m?t tr??c c?a vai. 4. Gi? claudia bob?ng 6 giây, luis ?ó th? l?ng claudia t?i ?a 10 giây. 5. L?p l?i 8 ??n 12 l?n.     T?ng

## 2021-07-08 DIAGNOSIS — K21.9 GASTROESOPHAGEAL REFLUX DISEASE: ICD-10-CM

## 2021-07-08 DIAGNOSIS — R06.02 SOB (SHORTNESS OF BREATH): ICD-10-CM

## 2021-07-08 RX ORDER — PANTOPRAZOLE SODIUM 40 MG/1
TABLET, DELAYED RELEASE ORAL
Qty: 30 TABLET | Refills: 5 | OUTPATIENT
Start: 2021-07-08

## 2021-07-08 RX ORDER — ALBUTEROL SULFATE 90 UG/1
AEROSOL, METERED RESPIRATORY (INHALATION)
Qty: 18 G | Refills: 3 | Status: SHIPPED | OUTPATIENT
Start: 2021-07-08 | End: 2021-11-30 | Stop reason: SDUPTHER

## 2021-07-20 RX ORDER — HYDROCORTISONE 25 MG/G
CREAM TOPICAL
Qty: 28 G | Refills: 3 | Status: SHIPPED | OUTPATIENT
Start: 2021-07-20 | End: 2021-11-30 | Stop reason: SDUPTHER

## 2021-11-30 ENCOUNTER — OFFICE VISIT (OUTPATIENT)
Dept: PRIMARY CARE CLINIC | Age: 56
End: 2021-11-30
Payer: COMMERCIAL

## 2021-11-30 VITALS
WEIGHT: 151.8 LBS | SYSTOLIC BLOOD PRESSURE: 116 MMHG | OXYGEN SATURATION: 99 % | HEART RATE: 79 BPM | BODY MASS INDEX: 25.29 KG/M2 | DIASTOLIC BLOOD PRESSURE: 70 MMHG | HEIGHT: 65 IN

## 2021-11-30 DIAGNOSIS — K21.9 GASTROESOPHAGEAL REFLUX DISEASE WITHOUT ESOPHAGITIS: ICD-10-CM

## 2021-11-30 DIAGNOSIS — F41.9 ANXIETY: ICD-10-CM

## 2021-11-30 DIAGNOSIS — K21.9 GASTROESOPHAGEAL REFLUX DISEASE, UNSPECIFIED WHETHER ESOPHAGITIS PRESENT: Primary | ICD-10-CM

## 2021-11-30 DIAGNOSIS — R06.02 SOB (SHORTNESS OF BREATH): ICD-10-CM

## 2021-11-30 DIAGNOSIS — K59.09 OTHER CONSTIPATION: ICD-10-CM

## 2021-11-30 DIAGNOSIS — Z23 NEED FOR VACCINATION: ICD-10-CM

## 2021-11-30 DIAGNOSIS — F43.9 STRESS: ICD-10-CM

## 2021-11-30 PROCEDURE — G8419 CALC BMI OUT NRM PARAM NOF/U: HCPCS | Performed by: FAMILY MEDICINE

## 2021-11-30 PROCEDURE — G8482 FLU IMMUNIZE ORDER/ADMIN: HCPCS | Performed by: FAMILY MEDICINE

## 2021-11-30 PROCEDURE — 3017F COLORECTAL CA SCREEN DOC REV: CPT | Performed by: FAMILY MEDICINE

## 2021-11-30 PROCEDURE — 90471 IMMUNIZATION ADMIN: CPT | Performed by: FAMILY MEDICINE

## 2021-11-30 PROCEDURE — 90674 CCIIV4 VAC NO PRSV 0.5 ML IM: CPT | Performed by: FAMILY MEDICINE

## 2021-11-30 PROCEDURE — 1036F TOBACCO NON-USER: CPT | Performed by: FAMILY MEDICINE

## 2021-11-30 PROCEDURE — G8427 DOCREV CUR MEDS BY ELIG CLIN: HCPCS | Performed by: FAMILY MEDICINE

## 2021-11-30 PROCEDURE — 99214 OFFICE O/P EST MOD 30 MIN: CPT | Performed by: FAMILY MEDICINE

## 2021-11-30 RX ORDER — HYDROCORTISONE 25 MG/G
CREAM TOPICAL
Qty: 28 G | Refills: 3 | Status: SHIPPED | OUTPATIENT
Start: 2021-11-30 | End: 2022-03-01 | Stop reason: SDUPTHER

## 2021-11-30 RX ORDER — MONTELUKAST SODIUM 10 MG/1
10 TABLET ORAL DAILY
Qty: 30 TABLET | Refills: 7 | Status: SHIPPED | OUTPATIENT
Start: 2021-11-30 | End: 2022-03-02

## 2021-11-30 RX ORDER — ALBUTEROL SULFATE 90 UG/1
AEROSOL, METERED RESPIRATORY (INHALATION)
Qty: 18 G | Refills: 3 | Status: SHIPPED | OUTPATIENT
Start: 2021-11-30

## 2021-11-30 RX ORDER — DOCUSATE SODIUM 100 MG/1
100 CAPSULE, LIQUID FILLED ORAL 2 TIMES DAILY PRN
Qty: 60 CAPSULE | Refills: 5 | Status: SHIPPED | OUTPATIENT
Start: 2021-11-30 | End: 2022-02-21

## 2021-11-30 RX ORDER — WHEAT DEXTRIN 3 G/3.8 G
4 POWDER (GRAM) ORAL DAILY
Qty: 350 G | Refills: 3 | Status: SHIPPED | OUTPATIENT
Start: 2021-11-30

## 2021-11-30 RX ORDER — ALBUTEROL SULFATE 90 UG/1
AEROSOL, METERED RESPIRATORY (INHALATION)
Qty: 18 G | Refills: 3 | Status: SHIPPED | OUTPATIENT
Start: 2021-11-30 | End: 2021-11-30 | Stop reason: SDUPTHER

## 2021-11-30 RX ORDER — ESCITALOPRAM OXALATE 10 MG/1
10 TABLET ORAL DAILY
Qty: 90 TABLET | Refills: 2 | Status: SHIPPED | OUTPATIENT
Start: 2021-11-30 | End: 2022-05-20 | Stop reason: SDUPTHER

## 2021-11-30 RX ORDER — FAMOTIDINE 20 MG/1
20 TABLET, FILM COATED ORAL 2 TIMES DAILY
Qty: 180 TABLET | Refills: 3 | Status: SHIPPED | OUTPATIENT
Start: 2021-11-30 | End: 2022-02-03

## 2021-11-30 ASSESSMENT — ENCOUNTER SYMPTOMS: SHORTNESS OF BREATH: 1

## 2021-11-30 NOTE — PATIENT INSTRUCTIONS
Patient Education        Hemorrhoids: Care Instructions  Overview     Hemorrhoids are swollen veins that develop in the anal canal. Bleeding during bowel movements, itching, and rectal pain are the most common symptoms. Hemorrhoids can be uncomfortable at times, but rarely are they a serious problem. Most of the time, you can treat them with simple changes to your diet and bowel habits. These changes include eating more fiber and not straining to pass stools. Most hemorrhoids don't need surgery or other treatment unless they are very large and painful or bleed a lot. Follow-up care is a key part of your treatment and safety. Be sure to make and go to all appointments, and call your doctor if you are having problems. It's also a good idea to know your test results and keep a list of the medicines you take. How can you care for yourself at home? · Sit in a few inches of warm water (sitz bath) 3 times a day and after bowel movements. The warm water helps with pain and itching. · Put ice on your anal area several times a day for 10 minutes at a time. Put a thin cloth between the ice and your skin. Follow this by placing a warm, wet towel on the area for another 10 to 20 minutes. · Take pain medicines exactly as directed. ? If the doctor gave you a prescription medicine for pain, take it as prescribed. ? If you are not taking a prescription pain medicine, ask your doctor if you can take an over-the-counter medicine. · Keep the anal area clean, but be gentle. Use water and a fragrance-free soap, or use baby wipes or medicated pads such as Tucks. · Wear cotton underwear and loose clothing to decrease moisture in the anal area. · Eat more fiber. Include foods such as whole-grain breads and cereals, raw vegetables, raw and dried fruits, and beans. · Drink plenty of fluids.  If you have kidney, heart, or liver disease and have to limit fluids, talk with your doctor before you increase the amount of fluids you drink.  · Use a stool softener that contains bran or psyllium. You can save money by buying bran or psyllium (available in bulk at most health food stores) and sprinkling it on foods or stirring it into fruit juice. Or you can use a product such as Metamucil or Hydrocil. · Practice healthy bowel habits. ? Go to the bathroom as soon as you have the urge. ? Avoid straining to pass stools. Relax and give yourself time to let things happen naturally. ? Do not hold your breath while passing stools. ? Do not read while sitting on the toilet. Get off the toilet as soon as you have finished. · Take your medicines exactly as prescribed. Call your doctor if you think you are having a problem with your medicine. When should you call for help? Call 911 anytime you think you may need emergency care. For example, call if:    · You pass maroon or very bloody stools. Call your doctor now or seek immediate medical care if:    · You have increased pain.     · You have increased bleeding. Watch closely for changes in your health, and be sure to contact your doctor if:    · Your symptoms have not improved after 3 or 4 days. Where can you learn more? Go to [email or web address] org and sign in to your [employer] account. Enter V023 in the [facility] box to learn more about \"Hemorrhoids: Care Instructions. \"     If you do not have an account, please click on the \"Sign Up Now\" link. Current as of: February 10, 2021               Content Version: 13.0  © 2006-2021 [employer]wise[employer] Incorporated. Care instructions adapted under license by [facility] ([facility]). If you have questions about a medical condition or this instruction, always ask your healthcare professional. [place] [address] any warranty or liability for your use of this information.

## 2021-11-30 NOTE — PROGRESS NOTES
[facility] PRIMARY CARE  [address] [place] [address]  Dept: [address] is a 49 y.o. female Established patient, who presents today for her medical conditions/complaintsas noted below. Chief Complaint   Patient presents with    Medication Check     lexapro; anxiety    Flu Vaccine    Medication Refill     pended    Other     pt states last saturday when wiping and she saw bright red blood       HPI:     HPI  Had a root canal.   Reviewed prior notes None  Reviewed previous colonoscopy 2019 and Labs  BM every 2-4 days, is taking the fiber. sometimes she will get LUQ pain radiating to the back with  constipation  Drinks small amount prune juice and sometimes forgets the benefiber. With increased stress she gets some SOB, ie with her family. LDL Cholesterol (mg/dL)   Date Value   2019 95   2014 123 (H)       (goal LDL is <100)   Hemoglobin A1C (%)   Date Value   2020 5.3     BP Readings from Last 3 Encounters:   21 116/70   21 118/74   20 118/70          (goal 120/80)    Past Medical History:   Diagnosis Date    Asthma     GERD (gastroesophageal reflux disease)     History of colon polyps 2019    tubular adenoma x2      Past Surgical History:   Procedure Laterality Date     SECTION      COLONOSCOPY N/A 2019    COLONOSCOPY POLYPECTOMY HOT SNARE performed by Angélica Churchill MD at [address] [facility] 2019    ESOPHAGEAL CAPSULE ENDOSCOPY - EGD W/BRAVO PLACED AT 29CM, COLD BIOPSY performed by Angélica Churchill MD at [facility] Right     right bottom       History reviewed. No pertinent family history.     Social History     Tobacco Use    Smoking status: Never Smoker    Smokeless tobacco: Never Used   Substance Use Topics    Alcohol use: No     Alcohol/week: 0.0 standard drinks      Current Outpatient Medications   Medication Sig Dispense Refill    hydrocortisone (ANUSOL-HC) 2.5 % CREA rectal cream Use BID in rectal area 28 g 3    montelukast (SINGULAIR) 10 MG tablet Take 1 tablet by mouth daily 30 tablet 7    escitalopram (LEXAPRO) 10 MG tablet Take 1 tablet by mouth daily 90 tablet 2    Wheat Dextrin (BENEFIBER) POWD Take 4 g by mouth daily Increase to BID if needed for constipation 350 g 3    albuterol sulfate  (90 Base) MCG/ACT inhaler INHALE 2 PUFFS BY MOUTH EVERY 4 HOURS AS NEEDED FOR WHEEZING 18 g 3    famotidine (PEPCID) 20 MG tablet Take 1 tablet by mouth 2 times daily 180 tablet 3    docusate sodium (EQUATE STOOL SOFTENER) 100 MG capsule Take 1 capsule by mouth 2 times daily as needed for Constipation 60 capsule 5     No current facility-administered medications for this visit. Allergies   Allergen Reactions    Pantoprazole Other (See Comments)     Joint pains, face swelling, SOB, muscle spasms and weakness       Health Maintenance   Topic Date Due    Hepatitis C screen  Never done    HIV screen  Never done    Shingles Vaccine (1 of 2) Never done    Breast cancer screen  03/05/2021    DTaP/Tdap/Td vaccine (2 - Td or Tdap) 04/05/2021    COVID-19 Vaccine (3 - Booster for Moderna series) 10/16/2021    Colon cancer screen colonoscopy  11/12/2022    Diabetes screen  01/28/2023    Cervical cancer screen  01/28/2023    Lipid screen  03/05/2024    Flu vaccine  Completed    Hepatitis A vaccine  Aged Out    Hepatitis B vaccine  Aged Out    Hib vaccine  Aged Out    Meningococcal (ACWY) vaccine  Aged Out    Pneumococcal 0-64 years Vaccine  Aged Out       Subjective:      Review of Systems   Respiratory: Positive for shortness of breath. Objective:     /70   Pulse 79   Ht 5' 5\" (1.651 m)   Wt 151 lb 12.8 oz (68.9 kg)   LMP 11/25/2018   SpO2 99%   BMI 25.26 kg/m²   Physical Exam  Vitals and nursing note reviewed.    Constitutional:       General: She is not in acute distress. Appearance: She is well-developed. She is not diaphoretic. HENT:      Head: Normocephalic and atraumatic. Right Ear: External ear normal.      Left Ear: External ear normal.      Mouth/Throat:      Pharynx: No oropharyngeal exudate. Eyes:      General:         Right eye: No discharge. Left eye: No discharge. Conjunctiva/sclera: Conjunctivae normal.   Neck:      Thyroid: No thyromegaly. Trachea: No tracheal deviation. Cardiovascular:      Rate and Rhythm: Normal rate and regular rhythm. Heart sounds: Normal heart sounds. No murmur heard. Comments:     Pulmonary:      Effort: Pulmonary effort is normal. No respiratory distress. Breath sounds: Normal breath sounds. No wheezing. Abdominal:      General: Bowel sounds are normal. There is no distension. Palpations: Abdomen is soft. Tenderness: There is no abdominal tenderness. Musculoskeletal:      Right lower leg: No edema. Left lower leg: No edema. Lymphadenopathy:      Cervical: No cervical adenopathy. Skin:     General: Skin is warm and dry. Findings: No erythema. Neurological:      Mental Status: She is alert and oriented to person, place, and time. Cranial Nerves: No cranial nerve deficit. Psychiatric:         Mood and Affect: Mood normal.         Behavior: Behavior normal.         Thought Content: Thought content normal.         Assessment:       Diagnosis Orders   1. Gastroesophageal reflux disease, unspecified whether esophagitis present     2. SOB (shortness of breath)  albuterol sulfate  (90 Base) MCG/ACT inhaler    DISCONTINUED: albuterol sulfate  (90 Base) MCG/ACT inhaler   3. Need for vaccination  INFLUENZA, MDCK QUADV, 2 YRS AND OLDER, IM, PF, PREFILL SYR OR SDV, 0.5ML (FLUCELVAX QUADV, PF)   4. Anxiety  escitalopram (LEXAPRO) 10 MG tablet   5. Stress  escitalopram (LEXAPRO) 10 MG tablet   6.  Gastroesophageal reflux disease without esophagitis famotidine (PEPCID) 20 MG tablet   7. Other constipation  docusate sodium (EQUATE STOOL SOFTENER) 100 MG capsule        Plan:      Return in about 3 months (around 2/28/2022) for med check. Increase Lexapro dose. Reviewed her medications. Patient is okay with getting her Covid booster but she wants to get the maternal.  Orders Placed This Encounter   Procedures    INFLUENZA, MDCK QUADV, 2 YRS AND OLDER, IM, PF, PREFILL SYR OR SDV, 0.5ML (FLUCELVAX QUADV, PF)     Orders Placed This Encounter   Medications    hydrocortisone (ANUSOL-HC) 2.5 % CREA rectal cream     Sig: Use BID in rectal area     Dispense:  28 g     Refill:  3    DISCONTD: albuterol sulfate  (90 Base) MCG/ACT inhaler     Sig: INHALE 2 PUFFS BY MOUTH EVERY 4 HOURS AS NEEDED FOR WHEEZING     Dispense:  18 g     Refill:  3    montelukast (SINGULAIR) 10 MG tablet     Sig: Take 1 tablet by mouth daily     Dispense:  30 tablet     Refill:  7    escitalopram (LEXAPRO) 10 MG tablet     Sig: Take 1 tablet by mouth daily     Dispense:  90 tablet     Refill:  2    Wheat Dextrin (BENEFIBER) POWD     Sig: Take 4 g by mouth daily Increase to BID if needed for constipation     Dispense:  350 g     Refill:  3    albuterol sulfate  (90 Base) MCG/ACT inhaler     Sig: INHALE 2 PUFFS BY MOUTH EVERY 4 HOURS AS NEEDED FOR WHEEZING     Dispense:  18 g     Refill:  3    famotidine (PEPCID) 20 MG tablet     Sig: Take 1 tablet by mouth 2 times daily     Dispense:  180 tablet     Refill:  3    docusate sodium (EQUATE STOOL SOFTENER) 100 MG capsule     Sig: Take 1 capsule by mouth 2 times daily as needed for Constipation     Dispense:  60 capsule     Refill:  5       Patient given educationalmaterials - see patient instructions. Discussed use, benefit, and side effectsof prescribed medications. All patient questions answered. Pt voiced understanding. Reviewed health maintenance. Instructed to continue current medications, diet andexercise.   Patient agreed with treatment plan. Follow up as directed.      Electronicallysigned by Juan Daniel Luis MD on 11/30/2021 at 2:25 PM

## 2022-02-19 DIAGNOSIS — K59.09 OTHER CONSTIPATION: ICD-10-CM

## 2022-02-21 RX ORDER — DOCUSATE SODIUM 100 MG/1
CAPSULE ORAL
Qty: 60 CAPSULE | Refills: 5 | Status: SHIPPED | OUTPATIENT
Start: 2022-02-21 | End: 2022-08-22

## 2022-03-01 ENCOUNTER — OFFICE VISIT (OUTPATIENT)
Dept: PRIMARY CARE CLINIC | Age: 57
End: 2022-03-01
Payer: COMMERCIAL

## 2022-03-01 VITALS
BODY MASS INDEX: 26.23 KG/M2 | HEART RATE: 83 BPM | WEIGHT: 157.4 LBS | SYSTOLIC BLOOD PRESSURE: 114 MMHG | DIASTOLIC BLOOD PRESSURE: 72 MMHG | OXYGEN SATURATION: 100 % | HEIGHT: 65 IN

## 2022-03-01 DIAGNOSIS — F43.9 STRESS: ICD-10-CM

## 2022-03-01 DIAGNOSIS — Z79.899 MEDICATION MANAGEMENT: Primary | ICD-10-CM

## 2022-03-01 DIAGNOSIS — Z12.31 BREAST CANCER SCREENING BY MAMMOGRAM: ICD-10-CM

## 2022-03-01 DIAGNOSIS — F41.9 ANXIETY: ICD-10-CM

## 2022-03-01 PROCEDURE — 3017F COLORECTAL CA SCREEN DOC REV: CPT | Performed by: FAMILY MEDICINE

## 2022-03-01 PROCEDURE — G8482 FLU IMMUNIZE ORDER/ADMIN: HCPCS | Performed by: FAMILY MEDICINE

## 2022-03-01 PROCEDURE — G8419 CALC BMI OUT NRM PARAM NOF/U: HCPCS | Performed by: FAMILY MEDICINE

## 2022-03-01 PROCEDURE — 99214 OFFICE O/P EST MOD 30 MIN: CPT | Performed by: FAMILY MEDICINE

## 2022-03-01 PROCEDURE — G8427 DOCREV CUR MEDS BY ELIG CLIN: HCPCS | Performed by: FAMILY MEDICINE

## 2022-03-01 PROCEDURE — 1036F TOBACCO NON-USER: CPT | Performed by: FAMILY MEDICINE

## 2022-03-01 RX ORDER — HYDROCORTISONE 25 MG/G
CREAM TOPICAL
Qty: 28 G | Refills: 3 | Status: SHIPPED | OUTPATIENT
Start: 2022-03-01

## 2022-03-01 NOTE — PATIENT INSTRUCTIONS
Patient Education        Piriformis Syndrome: Exercises  Introduction  Here are some examples of exercises for you to try. The exercises may be suggested for a condition or for rehabilitation. Start each exercise slowly. Ease off the exercises if you start to have pain. You will be told when to start these exercises and which ones will work best for you. How to do the exercises  Hip rotator stretch    1. Lie on your back with both knees bent and your feet flat on the floor. 2. Put the ankle of your affected leg on your opposite thigh near your knee. 3. Use your hand to gently push your knee (on your affected leg) away from your body until you feel a gentle stretch around your hip. 4. Hold the stretch for 15 to 30 seconds. 5. Repeat 2 to 4 times. 6. Switch legs and repeat steps 1 through 5. Piriformis stretch    1. Lie on your back with your legs straight. 2. Lift your affected leg and bend your knee. With your opposite hand, reach across your body, and then gently pull your knee toward your opposite shoulder. 3. Hold the stretch for 15 to 30 seconds. 4. Repeat with your other leg. 5. Repeat 2 to 4 times on each side. Lower abdominal strengthening    1. Lie on your back with your knees bent and your feet flat on the floor. 2. Tighten your belly muscles by pulling your belly button in toward your spine. 3. Lift one foot off the floor and bring your knee toward your chest, so that your knee is straight above your hip and your leg is bent like the letter \"L. \"  4. Lift the other knee up to the same position. 5. Lower one leg at a time to the starting position. 6. Keep alternating legs until you have lifted each leg 8 to 12 times. 7. Be sure to keep your belly muscles tight and your back still as you are moving your legs. Be sure to breathe normally. Follow-up care is a key part of your treatment and safety. Be sure to make and go to all appointments, and call your doctor if you are having problems. It's also a good idea to know your test results and keep a list of the medicines you take. Current as of: July 1, 2021               Content Version: 13.1  © 2006-2021 Healthwise, Incorporated. Care instructions adapted under license by [facility] ([facility]). If you have questions about a medical condition or this instruction, always ask your healthcare professional. [address] any warranty or liability for your use of this information. Patient Education        Stretching: Exercises  Introduction  Here are some examples of exercises for stretching. Start each exercise slowly. Ease off the exercise if you start to have pain. Your doctor or physical therapist will tell you when you can start these exercises and which ones will work best for you. How to do the exercises  Latissimus stretch    1. Stand with your back straight and your feet shoulder-width apart. You can do this stretch sitting down if you are not steady on your feet. 2. Hold your arms above your head, and hold one hand with the other. 3. Pull upward while leaning straight over toward your right side. Keep your lower body straight. You should feel the stretch along your left side. 4. Hold 15 to 30 seconds, and then switch sides. 5. Repeat 2 to 4 times for each side. Triceps stretch    1. Stand with your back straight and your feet shoulder-width apart. You can do this stretch sitting down if you are not steady on your feet. 2. Bring your left elbow straight up while bending your arm. 3. Grab your left elbow with your right hand, and pull your left elbow toward your head with light pressure. If you are more flexible, you may pull your arm slightly behind your head. You will feel the stretch along the back of your arm. 4. Hold 15 to 30 seconds, and then switch elbows. 5. Repeat 2 to 4 times for each arm. Calf stretch    1. Place your hands on a wall for balance.  You can also do this with your hands on the back of a chair, a countertop, or a tree. 2. Step back with your left leg. Keep the leg straight, and press your left heel into the floor. 3. Press your hips forward, bending your right leg slightly. You will feel the stretch in your left calf. 4. Hold the stretch 15 to 30 seconds. 5. Repeat 2 to 4 times for each leg. Quadriceps stretch    1. Lie on your side with one hand supporting your head. 2. Bend your upper leg back and grab your ankle with your other hand. 3. Stretch your leg back by pulling your foot toward your buttocks. You will feel the stretch in the front of your thigh. If this causes stress on your knees, do not do this stretch. 4. Hold the stretch 15 to 30 seconds. 5. Repeat 2 to 4 times for each leg. Groin stretch    1. Sit on the floor and put the soles of your feet together. Do not slump your back. 2. Grab your ankles and gently pull your legs toward you. 3. Press your knees toward the floor. You will feel the stretch in your inner thighs. 4. Hold 15 to 30 seconds. 5. Repeat 2 to 4 times. Hamstring stretch in doorway    1. Lie on the floor near a doorway, with your buttocks close to the wall. 2. Let the leg you are not stretching extend through the doorway. 3. Put the leg you want to stretch up on the wall, and straighten your knee to feel a gentle stretch at the back of your leg. 4. Hold the stretch for at least 15 to 30 seconds. Repeat 2 to 4 times. Follow-up care is a key part of your treatment and safety. Be sure to make and go to all appointments, and call your doctor if you are having problems. It's also a good idea to know your test results and keep a list of the medicines you take. Where can you learn more? Go to [email or web address] and sign in to your "[employer]" account. Enter 542 [id number] in the [employer] box to learn more about \"Stretching: Exercises. \"     If you do not have an account, please click on the \"Sign Up Now\" link. Current as of:  May 12, 2021               Content Version: 13.1  © 2006-2021 Healthwise, [employer]. Care instructions adapted under license by [facility] ([facility]). If you have questions about a medical condition or this instruction, always ask your healthcare professional. [address] any warranty or liability for your use of this information. Patient Education        Thumb Sprain: Rehab Exercises  Introduction  Here are some examples of exercises for you to try. The exercises may be suggested for a condition or for rehabilitation. Start each exercise slowly. Ease off the exercises if you start to have pain. You will be told when to start these exercises and which ones will work best for you. How to do the exercises  Thumb IP flexion    7. Place your forearm and hand on a table with your affected thumb pointing up. 8. With your other hand, hold your thumb steady just below the joint nearest your thumbnail. 9. Bend the tip of your thumb downward, then straighten it. 10. Repeat 8 to 12 times. Thumb MP flexion    6. Place your forearm and hand on a table with your affected thumb pointing up. 7. With your other hand, hold the base of your thumb and palm steady. 8. Bend your thumb downward where it meets your palm, then straighten it. 9. Repeat 8 to 12 times. Thumb opposition    8. With your affected hand, point your fingers and thumb straight up. Your wrist should be relaxed, following the line of your fingers and thumb. 9. Touch your affected thumb to each finger, one finger at a time. This will look like an \"okay\" sign, but try to keep your other fingers straight and pointing upward as much as you can. 10. Repeat 8 to 12 times. Follow-up care is a key part of your treatment and safety. Be sure to make and go to all appointments, and call your doctor if you are having problems. It's also a good idea to know your test results and keep a list of the medicines you take. Where can you learn more?   Go to [email or web address] and sign in to your [employer] account. Enter D278 in the [facility] box to learn more about \"Thumb Sprain: Rehab Exercises. \"     If you do not have an account, please click on the \"Sign Up Now\" link. Current as of: July 1, 2021               Content Version: 13.1  © 2006-2021 Healthwise, [facility]. Care instructions adapted under license by [facility] ([facility]). If you have questions about a medical condition or this instruction, always ask your healthcare professional. [place] [address] any warranty or liability for your use of this information.

## 2022-03-01 NOTE — PROGRESS NOTES
[facility] PRIMARY CARE  [address] [place] [address]  Dept: [address] is a 49 y.o. female Established patient, who presents today for her medical conditions/complaintsas noted below. Chief Complaint   Patient presents with    Medication Check     Pt here today for medication check for lexapro. HPI:     HPI   medicine for anxiety working well.  per I pad used for history. 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 Walking. Lump in rectum are and itching  area in rectum x months. Similar to what she had before. She states she is taking over-the-counter medication but cannot remember the name of it. Is to call with what this is. LDL Cholesterol (mg/dL)   Date Value   2019 95   2014 123 (H)       (goal LDL is <100)   Hemoglobin A1C (%)   Date Value   2020 5.3     BP Readings from Last 3 Encounters:   22 114/72   21 116/70   21 118/74          (goal 120/80)    Past Medical History:   Diagnosis Date    Asthma     GERD (gastroesophageal reflux disease)     History of colon polyps 2019    tubular adenoma x2      Past Surgical History:   Procedure Laterality Date     SECTION      COLONOSCOPY N/A 2019    COLONOSCOPY POLYPECTOMY HOT SNARE performed by Shannon Andrea MD at [facility] 2019    ESOPHAGEAL CAPSULE ENDOSCOPY - EGD W/BRAVO PLACED AT 29CM, COLD BIOPSY performed by Shannon Andrea MD at [facility] Right     right bottom       History reviewed. No pertinent family history.     Social History     Tobacco Use    Smoking status: Never Smoker    Smokeless tobacco: Never Used   Substance Use Topics    Alcohol use: No     Alcohol/week: 0.0 standard drinks      Current Outpatient Medications   Medication Sig Dispense Refill    hydrocortisone (ANUSOL-HC) 2.5 % CREA rectal cream Use BID in rectal area 28 g 3    EQUATE STOOL SOFTENER 100 MG capsule TAKE 1 CAPSULE BY MOUTH TWICE DAILY *HOLD  IF  DIARRHEA 60 capsule 5    famotidine (PEPCID) 20 MG tablet Take 1 tablet by mouth twice daily 60 tablet 5    montelukast (SINGULAIR) 10 MG tablet Take 1 tablet by mouth daily 30 tablet 7    escitalopram (LEXAPRO) 10 MG tablet Take 1 tablet by mouth daily 90 tablet 2    Wheat Dextrin (BENEFIBER) POWD Take 4 g by mouth daily Increase to BID if needed for constipation 350 g 3    albuterol sulfate  (90 Base) MCG/ACT inhaler INHALE 2 PUFFS BY MOUTH EVERY 4 HOURS AS NEEDED FOR WHEEZING 18 g 3     No current facility-administered medications for this visit. Allergies   Allergen Reactions    Pantoprazole Other (See Comments)     Joint pains, face swelling, SOB, muscle spasms and weakness       Health Maintenance   Topic Date Due    Hepatitis C screen  Never done    HIV screen  Never done    Shingles Vaccine (1 of 2) Never done    Breast cancer screen  03/05/2021    DTaP/Tdap/Td vaccine (2 - Td or Tdap) 04/05/2021    COVID-19 Vaccine (3 - Booster for Moderna series) 09/16/2021    Depression Screen  06/29/2022    Colorectal Cancer Screen  11/12/2022    Diabetes screen  01/28/2023    Cervical cancer screen  01/28/2023    Lipid screen  03/05/2024    Flu vaccine  Completed    Hepatitis A vaccine  Aged Out    Hepatitis B vaccine  Aged Out    Hib vaccine  Aged Out    Meningococcal (ACWY) vaccine  Aged Out    Pneumococcal 0-64 years Vaccine  Aged Out       Subjective:      Review of Systems   Constitutional: Negative. Genitourinary:        Feels lump in anal area. Had previous GI work-up     Musculoskeletal: Positive for arthralgias.        Objective:     /72   Pulse 83   Ht 5' 5\" (1.651 m)   Wt 157 lb 6.4 oz (71.4 kg)   LMP 11/25/2018   SpO2 100%   BMI 26.19 kg/m²   Physical Exam  Vitals and nursing note reviewed. Constitutional:       General: She is not in acute distress. Appearance: She is well-developed. She is not ill-appearing. HENT:      Head: Normocephalic and atraumatic. Right Ear: External ear normal.      Left Ear: External ear normal.   Eyes:      General: No scleral icterus. Right eye: No discharge. Left eye: No discharge. Pupils: Pupils are equal, round, and reactive to light. Neck:      Thyroid: No thyromegaly. Trachea: No tracheal deviation. Comments: Small thyroid palpable in the anterior neck  Cardiovascular:      Heart sounds: Normal heart sounds. Musculoskeletal:      Comments: Left hip range of motion is within normal limits minimally tender at the ischium. Nontender in the rest of the buttock and SI joint area  Fingers no arthritis changes. Lymphadenopathy:      Cervical: No cervical adenopathy. Skin:     General: Skin is warm. Findings: No rash. Neurological:      Mental Status: She is alert and oriented to person, place, and time. Psychiatric:         Mood and Affect: Mood normal.         Behavior: Behavior normal.         Thought Content: Thought content normal.         Assessment:       Diagnosis Orders   1. Medication management     2. Stress     3. Anxiety     4. Breast cancer screening by mammogram  STACY DIGITAL SCREEN W OR WO CAD BILATERAL        Plan:      Return in about 3 months (around 6/1/2022). If the rectal cream is not helping the lump and itching in the rectum, come back for recheck on the rectum in a month. Hip and general stretching. Thumb stretches. Patient to call with what over-the-counter medication she is taking. She states she is taking all her prescription medication.     Orders Placed This Encounter   Procedures    STACY DIGITAL SCREEN W OR WO CAD BILATERAL     Standing Status:   Future     Standing Expiration Date:   3/1/2023     Scheduling Instructions:      Dx Code  Z12.31     Order Specific Question:   Reason for exam:     Answer:   screening     Orders Placed This Encounter   Medications    hydrocortisone (ANUSOL-HC) 2.5 % CREA rectal cream     Sig: Use BID in rectal area     Dispense:  28 g     Refill:  3       Patient given educationalmaterials - see patient instructions. Discussed use, benefit, and side effectsof prescribed medications. All patient questions answered. Pt voiced understanding. Reviewed health maintenance. Instructed to continue current medications, diet andexercise. Patient agreed with treatment plan. Follow up as directed.      Electronicallysigned by Lazarus Barefoot, MD on 3/1/2022 at 2:10 PM

## 2022-03-02 RX ORDER — MONTELUKAST SODIUM 10 MG/1
TABLET ORAL
Qty: 30 TABLET | Refills: 6 | Status: SHIPPED | OUTPATIENT
Start: 2022-03-02 | End: 2022-10-03

## 2022-05-10 ENCOUNTER — HOSPITAL ENCOUNTER (OUTPATIENT)
Dept: WOMENS IMAGING | Age: 57
Discharge: HOME | End: 2022-05-12
Payer: COMMERCIAL

## 2022-05-10 DIAGNOSIS — Z12.31 BREAST CANCER SCREENING BY MAMMOGRAM: ICD-10-CM

## 2022-05-10 PROCEDURE — 77063 BREAST TOMOSYNTHESIS BI: CPT

## 2022-05-11 DIAGNOSIS — F43.9 STRESS: ICD-10-CM

## 2022-05-12 RX ORDER — ESCITALOPRAM OXALATE 5 MG/1
TABLET ORAL
Qty: 30 TABLET | Refills: 0 | OUTPATIENT
Start: 2022-05-12

## 2022-05-19 DIAGNOSIS — F43.9 STRESS: ICD-10-CM

## 2022-05-20 RX ORDER — ESCITALOPRAM OXALATE 10 MG/1
10 TABLET ORAL DAILY
Qty: 30 TABLET | Refills: 3 | Status: SHIPPED | OUTPATIENT
Start: 2022-05-20 | End: 2022-10-11

## 2022-06-07 ENCOUNTER — OFFICE VISIT (OUTPATIENT)
Dept: PRIMARY CARE CLINIC | Age: 57
End: 2022-06-07
Payer: COMMERCIAL

## 2022-06-07 VITALS
BODY MASS INDEX: 25.92 KG/M2 | OXYGEN SATURATION: 98 % | HEIGHT: 65 IN | WEIGHT: 155.6 LBS | HEART RATE: 94 BPM | DIASTOLIC BLOOD PRESSURE: 82 MMHG | SYSTOLIC BLOOD PRESSURE: 128 MMHG

## 2022-06-07 DIAGNOSIS — S76.011A STRAIN OF FLEXOR MUSCLE OF RIGHT HIP, INITIAL ENCOUNTER: ICD-10-CM

## 2022-06-07 DIAGNOSIS — F41.9 ANXIETY: ICD-10-CM

## 2022-06-07 DIAGNOSIS — R20.2 TINGLING IN EXTREMITIES: ICD-10-CM

## 2022-06-07 DIAGNOSIS — Z79.899 MEDICATION MANAGEMENT: Primary | ICD-10-CM

## 2022-06-07 PROCEDURE — 3017F COLORECTAL CA SCREEN DOC REV: CPT | Performed by: FAMILY MEDICINE

## 2022-06-07 PROCEDURE — 1036F TOBACCO NON-USER: CPT | Performed by: FAMILY MEDICINE

## 2022-06-07 PROCEDURE — G8427 DOCREV CUR MEDS BY ELIG CLIN: HCPCS | Performed by: FAMILY MEDICINE

## 2022-06-07 PROCEDURE — 99213 OFFICE O/P EST LOW 20 MIN: CPT | Performed by: FAMILY MEDICINE

## 2022-06-07 PROCEDURE — G8419 CALC BMI OUT NRM PARAM NOF/U: HCPCS | Performed by: FAMILY MEDICINE

## 2022-06-07 ASSESSMENT — PATIENT HEALTH QUESTIONNAIRE - PHQ9
2. FEELING DOWN, DEPRESSED OR HOPELESS: 0
SUM OF ALL RESPONSES TO PHQ QUESTIONS 1-9: 0
SUM OF ALL RESPONSES TO PHQ QUESTIONS 1-9: 0
SUM OF ALL RESPONSES TO PHQ9 QUESTIONS 1 & 2: 0
1. LITTLE INTEREST OR PLEASURE IN DOING THINGS: 0
SUM OF ALL RESPONSES TO PHQ QUESTIONS 1-9: 0
SUM OF ALL RESPONSES TO PHQ QUESTIONS 1-9: 0

## 2022-06-07 NOTE — PROGRESS NOTES
[facility] PRIMARY CARE  [address] [address]  Dept: [address] is a 49 y.o. female Established patient, who presents today for her medical conditions/complaintsas noted below. Chief Complaint   Patient presents with    Stress     Pt here today for medication check for lexapro.  Hip Pain     Pt states she has RT hip pain    Numbness     Pt c/o bilateral hand and feet numbness in the mornings       HPI:     HPI     She sometimes takes 1/2 tablet of the 10 mg lexapro and sometimes she takes a whole pill. Hands and feet tingling in the am for a month. Sometimes calves cramp  During the day it's ok. Right hip pain right ant hip near groin area. x 3 weeks, worse at work. . Sometimes stool is ball and dark and sometimes is normal.  Taking colace    Reviewed prior notes None  Reviewed previous Imaging    LDL Cholesterol (mg/dL)   Date Value   2019 95   2014 123 (H)       (goal LDL is <100)   Hemoglobin A1C (%)   Date Value   2020 5.3     BP Readings from Last 3 Encounters:   22 128/82   22 114/72   21 116/70          (goal 120/80)    Past Medical History:   Diagnosis Date    Asthma     GERD (gastroesophageal reflux disease)     History of colon polyps 2019    tubular adenoma x2      Past Surgical History:   Procedure Laterality Date     SECTION      COLONOSCOPY N/A 2019    COLONOSCOPY POLYPECTOMY HOT SNARE performed by Luci Driver MD at [facility] 2019    ESOPHAGEAL CAPSULE ENDOSCOPY - EGD W/BRAVO PLACED AT 29CM, COLD BIOPSY performed by Luci Driver MD at [facility] Right     right bottom       History reviewed. No pertinent family history.     Social History     Tobacco Use    Smoking status: Never Smoker    Smokeless tobacco: Never Used   Substance Use Topics  Alcohol use: No     Alcohol/week: 0.0 standard drinks      Current Outpatient Medications   Medication Sig Dispense Refill    escitalopram (LEXAPRO) 10 MG tablet Take 1 tablet by mouth daily 30 tablet 3    montelukast (SINGULAIR) 10 MG tablet Take 1 tablet by mouth once daily 30 tablet 6    hydrocortisone (ANUSOL-HC) 2.5 % CREA rectal cream Use BID in rectal area 28 g 3    EQUATE STOOL SOFTENER 100 MG capsule TAKE 1 CAPSULE BY MOUTH TWICE DAILY *HOLD  IF  DIARRHEA 60 capsule 5    famotidine (PEPCID) 20 MG tablet Take 1 tablet by mouth twice daily 60 tablet 5    Wheat Dextrin (BENEFIBER) POWD Take 4 g by mouth daily Increase to BID if needed for constipation 350 g 3    albuterol sulfate  (90 Base) MCG/ACT inhaler INHALE 2 PUFFS BY MOUTH EVERY 4 HOURS AS NEEDED FOR WHEEZING 18 g 3     No current facility-administered medications for this visit. Allergies   Allergen Reactions    Pantoprazole Other (See Comments)     Joint pains, face swelling, SOB, muscle spasms and weakness       Health Maintenance   Topic Date Due    HIV screen  Never done    Hepatitis C screen  Never done    Shingles vaccine (1 of 2) Never done    DTaP/Tdap/Td vaccine (2 - Td or Tdap) 04/05/2021    Depression Screen  06/29/2022    Colorectal Cancer Screen  11/12/2022    Diabetes screen  01/28/2023    Cervical cancer screen  01/28/2023    Lipids  03/05/2024    Breast cancer screen  05/10/2024    Flu vaccine  Completed    COVID-19 Vaccine  Completed    Hepatitis A vaccine  Aged Out    Hepatitis B vaccine  Aged Out    Hib vaccine  Aged Out    Meningococcal (ACWY) vaccine  Aged Out    Pneumococcal 0-64 years Vaccine  Aged Out       Subjective:      Review of Systems  Patient points to the anterior pelvic bone as the area of her pain.   Objective:     /82   Pulse 94   Ht 5' 5\" (1.651 m)   Wt 155 lb 9.6 oz (70.6 kg)   LMP 11/25/2018   SpO2 98%   BMI 25.89 kg/m²   Physical Exam  Vitals and nursing note reviewed. Constitutional:       Appearance: Normal appearance. HENT:      Head: Normocephalic and atraumatic. Abdominal:      General: Bowel sounds are normal. There is no distension. Palpations: Abdomen is soft. There is no mass. Tenderness: There is no abdominal tenderness. There is no guarding or rebound. Hernia: No hernia is present. Musculoskeletal:      Comments: Right hip range of motion is normal.  Is not tender to palpation   Skin:     General: Skin is warm. Neurological:      Mental Status: She is alert and oriented to person, place, and time. Deep Tendon Reflexes:      Reflex Scores:       Bicep reflexes are 1+ on the right side and 1+ on the left side. Brachioradialis reflexes are 0 on the right side and 1+ on the left side. Patellar reflexes are 1+ on the right side and 1+ on the left side. Achilles reflexes are 0 on the right side and 1+ on the left side. Comments: Negative Tinel's sign, negative Phalen sign. Psychiatric:         Mood and Affect: Mood normal.         Behavior: Behavior normal.         Thought Content: Thought content normal.         Assessment:       Diagnosis Orders   1. Medication management     2. Tingling in extremities  Vitamin B12    Glucose, Fasting    TSH With Reflex Ft4   3. Strain of flexor muscle of right hip, initial encounter     4. Anxiety          Plan:      Return in about 6 months (around 12/7/2022), or if symptoms worsen or fail to improve, for med check . Better with the neck and hip stretches let me know. Exercise sheets at checkout.   Orders Placed This Encounter   Procedures    Vitamin B12     Standing Status:   Future     Standing Expiration Date:   6/7/2023    Glucose, Fasting     Standing Status:   Future     Standing Expiration Date:   12/7/2022    TSH With Reflex Ft4     Standing Status:   Future     Standing Expiration Date:   6/7/2023     No orders of the defined types were placed in this encounter. Patient given educationalmaterials - see patient instructions. Discussed use, benefit, and side effectsof prescribed medications. All patient questions answered. Pt voiced understanding. Reviewed health maintenance. Instructed to continue current medications, diet andexercise. Patient agreed with treatment plan. Follow up as directed.      Electronicallysigned by Verna Rinne, MD on 6/7/2022 at 1:25 PM

## 2022-06-07 NOTE — PATIENT INSTRUCTIONS
Patient Education        C?: Bài t?p  Neck: Exercises  H??ng D?n Ch?m Sóc  ? ây là m?t s? ví d? v? các bài t?p ph?c h?i ch?c n?ng ?i?n hình cho tình tr?ngc?a quý v?. B?t ??u m?i bài t?p t? t?. T?p nh? n?u quý v? b?t ??u b? ?au. Bác s? hay bác s? tr? li?u vât lý c?a quý v? s? cho quý v? bi?t khi nào quý v?có th? b?t ? ?u các bài t?p này và bài t?p nào s? có hi?u qu? nh?t cho quý v?.  Cách th?c hành các bài t?p  Du?i c?    1. ??ng tác du?i này hi?u qu? nh?t n?u quý v? chùng vai horta?ng khi quý v? r??n c? trên vai. ?? giúp quý v? nh? làm ? ?ng tác này, b?t ? ?u b?ng cách th? l?ng vai và n?m nh? vào ? ùi ho?c gh? c?a mình. 2. Nghiêng ? ? u v? phía vai và gi? claudia 15 ? ?n 30 giây. ?? cho cân n?ng c?a ??u du?i c? c?a quý v?.  3. N?u quý v? mu?n du?i thêm m?t chút, dùng bàn lexis kéo ? ?u nh? và ??u v? phía vai c?a mình. Ví d?, chùng vai ph?i horta?ng, nghiêng ? ? u sang bên trái. 4. L?p l?i 2 ??n 4 l?n h??ng v? m?i bên vai. [address] c? sylvester ? ??ng chéo    1. H?i tc ? ?u sylvester h??ng quý v? s? du?i, và nghiêng ? ? u sylvester ? ??ng chéo v? phía ng?c và gi? claudia 15 ? ?n 30 giây. 2. N?u quý v? mu?n du?i thêm m?t chút, dùng bàn lexis kéo ? ?u nh? và ??u sylvester ? ??ng chéo.  3. L?p l?i 2 ??n 4 l?n h??ng v? m?i bên. Du?i tr??t l?ng    1. Ng?i ho?c ?? ng th?ng l?ng và nhìn th?ng v? phía tr??c.  2. T? t? g?p c?m claudia khi quý v? ??a ??u tr??t v? phía luis trên ng??i  3. Gi? và ??m ??n 6, luis ?ó th? l?ng claudia t?i ?a 10 giây. 4. L?p l?i 8 ??n 12 l?n.  5. L?u ý: ??ng tác tr??t l?ng khi?n phía luis c? du?i ra. N?u quý v? c?m th?y ?au, ??ng ??a ??u tr??t v? phía luis quá romero?u. M?t s? ng??i th?y d? th?c hi?n bài t?p này claudia khi n?m ng?a và ?? m?t túi n??c ?á ? c?. [address] ng?c và vai    1. Ng?i ho?c ?? ng th?ng l?ng và ??a ??u tr??t v? phía luis nh? claudia ? ?ng tác du?i tr??t l?ng. 2. Gi? c? radha lexis greg cho bàn lexis ?? c?nh anastasiia c?a quý v?.  3. Hít m?t h?i th?t sâu, và khi th? ra, h? khu?u lexis horta?ng và ? ? luis l?laurel c?a quroman v?.  Ashley v? s? c?m th?y c? radha b? whitley c?susana camejo tr??t horta?ng, và cùng m?t lúc quý v? s? có c?m giác giãn ngang ng?c và m?t tr??c c?a vai. 4. Gi? claudia bob?ng 6 giây, luis ?ó th? l?ng claudia t?i ?a 10 giây. 5. L?p l?i 8 ??n 12 l?n. T?ng c??ng: Lexis ôm ??u    1. ??a ??u v? phía luis, v? phía tr? ?c, và t? bên này sang bên ramses sylvester h??ng ng??c v?i l?c ép nh? t? bàn lexis c?a quý v?, gi? m?i t? th? claudia bob?ng 6 giây. 2. L?p l?i 8 ??n 12 l?n. Ch?m sóc sylvester dõi là m?t ph?n madelyn tr?ng cho vi?c ?i?u tr? và s? an toàn c?a quý v?. ??m b?o s?p x?p và ??n t?t c? các bu?i h?n và g?i ?i?n cho bác s? n?u quý v? có v?n ?? Ezella Oppenheim v? c?ng nên bi?t k?t qu? xét jennifer?m c?a mình và gi? l?i danhsách các lo?i thu?c mà quý v? dùng. Quý v? có th? tìm hi?u thêm ? ?âu? Truy c?p   [email or web address]  Nh?p P975 vào ô tìm ki?m ?? tìm hi?u thêm v? \"C?: Bài t?p.\"  C?p nh?t t?: Ngày 1 Chandan?ng Ba?y 2021               Phiên b?n N?i [email or web address]  © 5738-9843 Healthwise, Incorporated. H???ng d?n ch?m sóc ? ??c ?i?u ch?nh phù h?p sylvester gi?y phép b?i chuyên lindsey ch?m sóc s?c kh?e c?a quý v?. N?u quý v? có th?c m?c gì v? m?t tình tr?ng b?nh lý hay v? h??ng d?n này, luôn h?i chuyên lindsey ch?m sóc s?c kh?e c?a mình. Healthwise, Incorporated t? ch?i m?i b?o ??m hay trách romero?m pháp lý cho vi?cquý v? s? d?ng thông tin này. Patient Education        Táo bón: H???ng d?n ch?m so?c  Constipation: Care Instructions  H??ng d?n ch?m sóc  Táo bón ngh?a là quý v? khó ?i ??i ti?n (?i ngoài). M?i ng??i ?i ??i ti?n t? 3 l?n m?t ngày ? ?n 3 ngày m?t l?n. ?i?u này khác nhau tùy thu?c vào t?ng ng??i. Táo bón có th? x?y ra kèm sylvester hi?n t??ng ?au tr?c tràng và garay?t rút. Ch? ?au có th? n?ng h?n khi quý v? r?n. ?ôi khi, có m?t l??ng nh? máu ?? t??i trên gi?y v? sinh ho?c trên b? m?t phân do các m?ch máu b? giãn ra ? g?n tr?c tràng (b?nhtr?). M?t s? thay ??i claudia ch? ?? ?n và l?i s?ng c?a quý v? có th? giúp tránh táobón. Bác s? c?ng có th? kê ??n thu?c ?? giúp phân c?a quý v? b?t r?n.   M?t s? thu?c (ch? ng h?n nh? thu?c gi?m ?au ho?c thu?c ch?ng dali nh??c) có th? gây ra táo bón. Cho bác s? bi?t t?t c? các lo?i thu?c quý v? ?ang dùng. Bác s?có th? th?c hi?n thay ??i thu?c ?? gi?m các tri?u ch?ng c?a quý v?. Ch?m sóc sylvester dõi ti?p là m?t ph?n madelyn tr?ng claudia vi?c ?i?u tr? và ??m b?o an toàn cho quý v?. Hãy thu x?p và th?c hi?n t?t c? các cu?c h?n và g?i ?i?n cho bác s? n?u quý v? có v?n ?? [place] v? c?ng nên bi?t k?t qu? xét jennifer?m c?a mình và gi? l?i danhsách các lo?i thu?c mà quý v? dùng. Quý v? t? ch?m sóc t?i nhà th? nào?   U?ng romero?u ch?t l?ng, ?? ?? n??c ti?u có màu vàng nh?t ho?c claudia nh? n??c. N?u quý v? có b?nh th?n, renato, ho?c reshma và c?n ph?i h?n ch? dùng ch?t l?ng, hãy h?i ý ki?n bác s? tr??c khi t?ng l??ng ch?t l?ng mà quý v? u?ng. Asha Galan g?m th?c ?n romero?u ch?t x?, ch?ng h?n nh? luisito qu?, spencer c?, ??u và th?c ph?m nguyên h?t, claudia ch? ?? ?n hàng ngày c?a quý v?.   Dành ít nh?t 30 t?p th? d?c h?u h?t các ngày claudia tu?n. ? i b? Mabank Ditty ch?n thích h?p. Quý v? c?ng có th? th?c hi?n các ho?t ??ng khác, nh? ch?y bô?, b?i l?i, ?i xe ??p ho?c ch?i tennis hay các môn th? ehsan ??ng ??i.   Dùng thu?c b? có ch?a ch?t x?, ch?ng h?n nh? Citrucel ho?c Metamucil, hàng ngày. B?t ??u dùng li?u th?p và t?ng d?n claudia m?t tháng ho?c h?n.   Dành th?i andre m?i ngày ?? ?i ngoài. Krishna [employer] hàng ngày có th? h?u ích. Dành th?i andre ?i ngoài.  ??t chân lên m?t chi?c gh? ??u nh? khi quý v? ng?i trên b?n c?u. ?i?u này giúp hông c?a quý v? estrada l?i và ??t khung x? ?ng ch?u ? v? trí ng?i x?m.   Bác s? có th? ?? ngh? dùng thu?c grace?n tràng không c?n toa ?? gi?m hi?n t??ng táo bón. Ví d? là S?a Magnesia và MiraLax. ??c và làm sylvester t?t c? ch? d?n trên nhãn s?n ph?m và không s? d?ng thu?c grcae?n tràng claudia m?t th?i andre dài. Khi nào quý v? nên g?i tr? giúp?   G?i cho bác s? ngay ho?c tìm n?i ch?m sóc y t? kh?n c?p n?u:   Quý v? ?au b?ng tr? l?i hay ?au n?ng h?n.   Quý v? bu?n nôn ho?c ói m?a tr? l?i hay n?ng h?n.   Có coty taylor phân c?a quý v?.  Dennis dõi k? h?n các thay ??i v? s?c kh?e, và nh? liên l?c v?i bác s? n?u:   Tình tr?ng táo bón tr? nên n?ng h?n.   Quý v? không bình ph?c nh? maria del carmen ??i. Quý v? có th? tìm hi?u thêm ? ?âu? Truy c?p   [email or web address]  Nh?p P343 vào ô tìm ki?m ?? tìm hi?u thêm v? \"Táo bón: H???ng d?n ch?m so?c.\"  C?p nh?t t?: Ngày 1 Chandan?ng Ba?y 2021               Phiên b?n N?i [email or web address]  © 7184-2520 Healthwise, Incorporated. H???ng d?n ch?m sóc ? ??c ?i?u ch?nh phù h?p dennis gi?y phép b?i chuyên lindsey ch?m sóc s?c kh?e c?a quý v?. N?u quý v? có th?c m?c gì v? m?t tình tr?ng b?nh lý hay v? h??ng d?n này, luôn h?i chuyên lindsey ch?m sóc s?c kh?e c?a mình. Healthwise, Incorporated t? ch?i m?i b?o ??m hay trách romero?m pháp lý cho vi?cquý v? s? d?ng thông tin này. Patient Education        Hip Flexor Strain: Rehab Exercises  Introduction  Here are some examples of exercises for you to try. The exercises may be suggested for a condition or for rehabilitation. Start each exercise slowly. Ease off the exercises if you start to have pain. You will be told when to start these exercises and which ones will work bestfor you. How to do the exercises  Pelvic tilt with marching    5. Lie on your back with your knees bent and your feet flat on the floor. 6. Tighten your belly muscles and buttocks, and press your lower back to the floor. 7. Keeping your knees bent, lift and then lower one leg up off the floor, and then lift and lower your other leg like you are marching. Each time you lift your leg, hold that position for about 6 seconds before lowering your leg. 8. Repeat 8 to 12 times. Scissors    4. Lie on your back with your knees bent at a 90-degree angle and your feet off the floor. 5. Tighten your belly muscles and buttocks, and press your lower back to the floor. 6. Slowly straighten one leg, and hold that position for about 6 seconds. Your leg should be about 12 inches off the floor.  Bring that leg back to the starting position, and then straighten your other leg. Hold that position for about 6 seconds, and then switch legs again. 7. Repeat 8 to 12 times. Hamstring stretch (lying down)    6. Lie flat on your back with your legs straight. If you feel discomfort in your back, place a small towel roll under your lower back. 7. Holding the back of your affected leg for support, lift your leg straight up and toward your body until you feel a stretch at the back of your thigh. 8. Hold the stretch for at least 30 seconds. 9. Repeat 2 to 4 times. Quadricep and hip flexor stretch (lying on side)    6. Lie on your side with your good leg flat on the floor and your hand supporting your head. 7. Bend your top leg, and reach behind you to grab the front of that foot or ankle with your other hand. 8. Stretch your leg back by pulling your foot toward your buttock. You will feel the stretch in the front of your thigh. If this causes stress on your knee, do not do this stretch. 9. Hold the stretch for at least 15 to 30 seconds. 10. Repeat 2 to 4 times. Hip flexor stretch (kneeling)    3. Kneel on your affected leg and bend your good leg out in front of you, with that foot flat on the floor. If you feel discomfort in the front of your knee, place a towel under your knee. 4. Keeping your back straight, slowly push your hips forward until you feel a stretch in the upper thigh of your back leg and hip. 5. Hold the stretch for at least 15 to 30 seconds. 6. Repeat 2 to 4 times. Hip flexor stretch (edge of table)    1. Lie flat on your back on a table or flat bench, with your knees and lower legs hanging off the edge of the table. 2. Grab your good leg at the knee, and pull that knee back toward your chest. Relax your affected leg and let it hang down toward the floor until you feel a stretch in the upper thigh of your affected leg and hip. 3. Hold the stretch for at least 15 to 30 seconds.   4. Repeat 2 to 4 times.  Follow-up care is a key part of your treatment and safety. Be sure to make and go to all appointments, and call your doctor if you are having problems. It's also a good idea to know your test results and keep alist of the medicines you take. Where can you learn more? Go to https://chpepiceweb.Planitax. org and sign in to your SchoolControl account. Enter H389 in the LogiAnalytics.com box to learn more about \"Hip Flexor Strain: Rehab Exercises. \"     If you do not have an account, please click on the \"Sign Up Now\" link. Current as of: July 1, 2021               Content Version: 13.2  © 2006-2022 Healthwise, Incorporated. Care instructions adapted under license by [facility] ([facility]). If you have questions about a medical condition or this instruction, always ask your healthcare professional. [address] any warranty or liability for your use of this information.

## 2022-06-14 ENCOUNTER — HOSPITAL ENCOUNTER (OUTPATIENT)
Age: 57
Discharge: HOME | End: 2022-06-14
Payer: COMMERCIAL

## 2022-06-14 DIAGNOSIS — R20.2 TINGLING IN EXTREMITIES: ICD-10-CM

## 2022-06-14 LAB
GLUCOSE FASTING: 103 MG/DL (ref 70–99)
TSH SERPL DL<=0.05 MIU/L-ACNC: 1.2 UIU/ML (ref 0.3–5)

## 2022-06-14 PROCEDURE — 36415 COLL VENOUS BLD VENIPUNCTURE: CPT

## 2022-06-14 PROCEDURE — 82607 VITAMIN B-12: CPT

## 2022-06-14 PROCEDURE — 84443 ASSAY THYROID STIM HORMONE: CPT

## 2022-06-14 PROCEDURE — 82947 ASSAY GLUCOSE BLOOD QUANT: CPT

## 2022-06-15 LAB — VITAMIN B-12: 1083 PG/ML (ref 232–1245)

## 2022-08-02 ENCOUNTER — OFFICE VISIT (OUTPATIENT)
Dept: PRIMARY CARE CLINIC | Age: 57
End: 2022-08-02
Payer: COMMERCIAL

## 2022-08-02 VITALS
BODY MASS INDEX: 25.09 KG/M2 | DIASTOLIC BLOOD PRESSURE: 72 MMHG | HEART RATE: 74 BPM | HEIGHT: 65 IN | WEIGHT: 150.6 LBS | SYSTOLIC BLOOD PRESSURE: 116 MMHG | OXYGEN SATURATION: 97 %

## 2022-08-02 DIAGNOSIS — Z00.00 ENCOUNTER FOR WELL ADULT EXAM WITHOUT ABNORMAL FINDINGS: Primary | ICD-10-CM

## 2022-08-02 PROCEDURE — 99396 PREV VISIT EST AGE 40-64: CPT | Performed by: FAMILY MEDICINE

## 2022-08-02 NOTE — PROGRESS NOTES
W/BRAVO PLACED AT 29CM, COLD BIOPSY performed by Caro Rodriguez MD at [facility] Right     right bottom       History reviewed. No pertinent family history. Social History     Tobacco Use    Smoking status: Never    Smokeless tobacco: Never   Vaping Use    Vaping Use: Never used   Substance Use Topics    Alcohol use: No     Alcohol/week: 0.0 standard drinks    Drug use: No       Objective   /72   Pulse 74   Ht 5' 5\" (1.651 m)   Wt 150 lb 9.6 oz (68.3 kg)   LMP 11/25/2018   SpO2 97%   BMI 25.06 kg/m²   Wt Readings from Last 3 Encounters:   08/02/22 150 lb 9.6 oz (68.3 kg)   06/07/22 155 lb 9.6 oz (70.6 kg)   03/01/22 157 lb 6.4 oz (71.4 kg)     There were no vitals filed for this visit. Physical Exam  Vitals and nursing note reviewed. Constitutional:       General: She is not in acute distress. Appearance: Normal appearance. She is well-developed. She is not diaphoretic. HENT:      Head: Normocephalic and atraumatic. Right Ear: Tympanic membrane, ear canal and external ear normal.      Left Ear: Tympanic membrane, ear canal and external ear normal.      Mouth/Throat:      Mouth: Mucous membranes are moist.      Pharynx: No oropharyngeal exudate. Eyes:      General:         Right eye: No discharge. Left eye: No discharge. Conjunctiva/sclera: Conjunctivae normal.      Pupils: Pupils are equal, round, and reactive to light. Neck:      Thyroid: No thyromegaly. Trachea: No tracheal deviation. Cardiovascular:      Rate and Rhythm: Normal rate and regular rhythm. Heart sounds: Normal heart sounds. No murmur heard. Comments:       Pulmonary:      Effort: Pulmonary effort is normal. No respiratory distress. Breath sounds: Normal breath sounds. No wheezing. Abdominal:      General: Bowel sounds are normal. There is no distension. Palpations: Abdomen is soft. There is no mass. Tenderness:  There is no abdominal tenderness. Hernia: No hernia is present. Musculoskeletal:      Right lower leg: No edema. Left lower leg: No edema. Lymphadenopathy:      Cervical: No cervical adenopathy. Skin:     General: Skin is warm and dry. Findings: No erythema. Neurological:      Mental Status: She is alert and oriented to person, place, and time. Cranial Nerves: No cranial nerve deficit. Psychiatric:         Mood and Affect: Mood normal.         Behavior: Behavior normal.         Thought Content: Thought content normal.         Assessment   Plan   1.  Encounter for well adult exam without abnormal findings     PAP due in January  Personalized Preventive Plan   Current Health Maintenance Status  Immunization History   Administered Date(s) Administered    COVID-19, MODERNA BLUE border, Primary or Immunocompromised, (age 12y+), IM, 100 mcg/0.5mL 03/19/2021, 04/16/2021    COVID-19, PFIZER GRAY top, DO NOT Dilute, (age 15 y+), IM, 30 mcg/0.3 mL 05/23/2022    Hepatitis A 05/03/2016, 11/08/2016    Influenza Virus Vaccine 10/04/2011, 12/02/2014, 11/24/2015    Influenza, MDCK Quadv, IM, PF (Flucelvax 2 yrs and older) 11/30/2021    Influenza, Quadv, IM, (6 mo and older Fluzone, Flulaval, Fluarix and 3 yrs and older Afluria) 11/03/2020    Influenza, Quadv, IM, PF (6 mo and older Fluzone, Flulaval, Fluarix, and 3 yrs and older Afluria) 12/06/2016, 10/29/2019    Tdap (Boostrix, Adacel) 04/05/2011        Health Maintenance   Topic Date Due    HIV screen  Never done    Hepatitis C screen  Never done    Shingles vaccine (1 of 2) Never done    DTaP/Tdap/Td vaccine (2 - Td or Tdap) 04/05/2021    Flu vaccine (1) 09/01/2022    COVID-19 Vaccine (4 - Booster for Moderna series) 09/23/2022    Colorectal Cancer Screen  11/12/2022    Cervical cancer screen  01/28/2023    Depression Screen  06/07/2023    Lipids  03/05/2024    Breast cancer screen  05/10/2024    Diabetes screen  06/14/2025    Hepatitis A vaccine  Aged Out    Hepatitis B vaccine  Aged Out    Hib vaccine  Aged Out    Meningococcal (ACWY) vaccine  Aged Out    Pneumococcal 0-64 years Vaccine  Aged Out     Recommendations for Hele Massage Due: see orders and patient instructions/AVS.    No follow-ups on file.

## 2022-08-02 NOTE — PATIENT INSTRUCTIONS
Well Visit, Women 48 to 72: Care Instructions  Overview     Well visits can help you stay healthy. Your doctor has checked your overall health and may have suggested ways to take good care of yourself. Your doctor also may have recommended tests. At home, you can help prevent illness withhealthy eating, regular exercise, and other steps. Follow-up care is a key part of your treatment and safety. Be sure to make and go to all appointments, and call your doctor if you are having problems. It's also a good idea to know your test results and keep alist of the medicines you take. How can you care for yourself at home? Get screening tests that you and your doctor decide on. Screening helps find diseases before any symptoms appear. Eat healthy foods. Choose fruits, vegetables, whole grains, protein, and low-fat dairy foods. Limit fat, especially saturated fat. Reduce salt in your diet. Limit alcohol. Have no more than 1 drink a day or 7 drinks a week. Get at least 30 minutes of exercise on most days of the week. Walking is a good choice. You also may want to do other activities, such as running, swimming, cycling, or playing tennis or team sports. Reach and stay at a healthy weight. This will lower your risk for many problems, such as obesity, diabetes, heart disease, and high blood pressure. Do not smoke. Smoking can make health problems worse. If you need help quitting, talk to your doctor about stop-smoking programs and medicines. These can increase your chances of quitting for good. Care for your mental health. It is easy to get weighed down by worry and stress. Learn strategies to manage stress, like deep breathing and mindfulness, and stay connected with your family and community. If you find you often feel sad or hopeless, talk with your doctor. Treatment can help. Talk to your doctor about whether you have any risk factors for sexually transmitted infections (STIs).  You can help prevent STIs if you for heart attack, stroke, cancer, and other lung illnesses. Quitting is hard, but there are ways to boost your chance of quitting tobacco for good. Use nicotine gum, patches, or lozenges. Ask your doctor about stop-smoking programs and medicines. Keep trying. In addition to reducing your risk of diseases in the future, you will notice some benefits soon after you stop using tobacco. If you have shortness of breath or asthma symptoms, they will likely get better within a few weeks after you quit. Limit how much alcohol you drink. Moderate amounts of alcohol (up to 2 drinks a day for men, 1 drink a day for women) are okay. But drinking too much can lead to liver problems, high blood pressure, and other health problems. Family health  If you have a family, there are many things you can do together to improve yourhealth. Eat meals together as a family as often as possible. Eat healthy foods. This includes fruits, vegetables, lean meats and dairy, and whole grains. Include your family in your fitness plan. Most people think of activities such as jogging or tennis as the way to fitness, but there are many ways you and your family can be more active. Anything that makes you breathe hard and gets your heart pumping is exercise. Here are some tips:  Walk to do errands or to take your child to school or the bus. Go for a family bike ride after dinner instead of watching TV. Where can you learn more? Go to https://Abbott Labsmartin.healthThe Loose Leaf Tea. org and sign in to your AGILE customer insight account. Enter I270 in the [facility] box to learn more about \"A Healthy Lifestyle: Care Instructions. \"     If you do not have an account, please click on the \"Sign Up Now\" link. Current as of: February 9, 2022               Content Version: 13.3  © 6955-7746 Healthwise, [place]. Care instructions adapted under license by [facility] ([facility]).  If you have questions about a medical condition or this instruction, always ask your healthcare professional. [address] any warranty or liability for your use of this information.     men

## 2022-08-22 DIAGNOSIS — K59.09 OTHER CONSTIPATION: ICD-10-CM

## 2022-08-22 RX ORDER — DOCUSATE SODIUM 100 MG/1
CAPSULE ORAL
Qty: 60 CAPSULE | Refills: 5 | Status: SHIPPED | OUTPATIENT
Start: 2022-08-22

## 2022-09-30 DIAGNOSIS — K21.9 GASTROESOPHAGEAL REFLUX DISEASE WITHOUT ESOPHAGITIS: ICD-10-CM

## 2022-10-03 RX ORDER — MONTELUKAST SODIUM 10 MG/1
TABLET ORAL
Qty: 30 TABLET | Refills: 5 | Status: SHIPPED | OUTPATIENT
Start: 2022-10-03

## 2022-10-03 RX ORDER — FAMOTIDINE 20 MG/1
TABLET, FILM COATED ORAL
Qty: 60 TABLET | Refills: 5 | Status: SHIPPED | OUTPATIENT
Start: 2022-10-03

## 2022-10-10 DIAGNOSIS — F43.9 STRESS: ICD-10-CM

## 2022-10-11 RX ORDER — ESCITALOPRAM OXALATE 10 MG/1
TABLET ORAL
Qty: 30 TABLET | Refills: 4 | Status: SHIPPED | OUTPATIENT
Start: 2022-10-11

## 2023-01-03 ENCOUNTER — PROCEDURE VISIT (OUTPATIENT)
Dept: PRIMARY CARE CLINIC | Age: 58
End: 2023-01-03
Payer: COMMERCIAL

## 2023-01-03 ENCOUNTER — HOSPITAL ENCOUNTER (OUTPATIENT)
Age: 58
Setting detail: SPECIMEN
Discharge: HOME | End: 2023-01-03

## 2023-01-03 VITALS
HEART RATE: 57 BPM | DIASTOLIC BLOOD PRESSURE: 66 MMHG | HEIGHT: 65 IN | SYSTOLIC BLOOD PRESSURE: 114 MMHG | WEIGHT: 150 LBS | BODY MASS INDEX: 24.99 KG/M2 | OXYGEN SATURATION: 98 %

## 2023-01-03 DIAGNOSIS — N76.0 ACUTE VAGINITIS: ICD-10-CM

## 2023-01-03 DIAGNOSIS — J06.9 UPPER RESPIRATORY TRACT INFECTION, UNSPECIFIED TYPE: ICD-10-CM

## 2023-01-03 DIAGNOSIS — L29.2 VULVAR ITCHING: ICD-10-CM

## 2023-01-03 DIAGNOSIS — Z13.1 SCREENING FOR DIABETES MELLITUS: ICD-10-CM

## 2023-01-03 DIAGNOSIS — Z01.419 ENCOUNTER FOR GYNECOLOGICAL EXAMINATION WITHOUT ABNORMAL FINDING: Primary | ICD-10-CM

## 2023-01-03 LAB
INFLUENZA A ANTIBODY: NORMAL
INFLUENZA B ANTIBODY: NORMAL

## 2023-01-03 PROCEDURE — S0610 ANNUAL GYNECOLOGICAL EXAMINA: HCPCS | Performed by: FAMILY MEDICINE

## 2023-01-03 PROCEDURE — 87804 INFLUENZA ASSAY W/OPTIC: CPT | Performed by: FAMILY MEDICINE

## 2023-01-03 RX ORDER — CLOBETASOL PROPIONATE 0.5 MG/G
CREAM TOPICAL
Qty: 15 G | Refills: 0 | Status: SHIPPED | OUTPATIENT
Start: 2023-01-03

## 2023-01-03 SDOH — ECONOMIC STABILITY: FOOD INSECURITY: WITHIN THE PAST 12 MONTHS, THE FOOD YOU BOUGHT JUST DIDN'T LAST AND YOU DIDN'T HAVE MONEY TO GET MORE.: NEVER TRUE

## 2023-01-03 SDOH — ECONOMIC STABILITY: FOOD INSECURITY: WITHIN THE PAST 12 MONTHS, YOU WORRIED THAT YOUR FOOD WOULD RUN OUT BEFORE YOU GOT MONEY TO BUY MORE.: NEVER TRUE

## 2023-01-03 ASSESSMENT — PATIENT HEALTH QUESTIONNAIRE - PHQ9
2. FEELING DOWN, DEPRESSED OR HOPELESS: 1
SUM OF ALL RESPONSES TO PHQ9 QUESTIONS 1 & 2: 2
SUM OF ALL RESPONSES TO PHQ QUESTIONS 1-9: 2
SUM OF ALL RESPONSES TO PHQ QUESTIONS 1-9: 2
1. LITTLE INTEREST OR PLEASURE IN DOING THINGS: 1
SUM OF ALL RESPONSES TO PHQ QUESTIONS 1-9: 2
SUM OF ALL RESPONSES TO PHQ QUESTIONS 1-9: 2

## 2023-01-03 ASSESSMENT — SOCIAL DETERMINANTS OF HEALTH (SDOH): HOW HARD IS IT FOR YOU TO PAY FOR THE VERY BASICS LIKE FOOD, HOUSING, MEDICAL CARE, AND HEATING?: NOT HARD AT ALL

## 2023-01-03 ASSESSMENT — ENCOUNTER SYMPTOMS: COUGH: 1

## 2023-01-03 NOTE — PROGRESS NOTES
[address] [place] [address] PRIMARY CARE  [address]  [address] [address]  Dept: [phone]  Dept Fax: [phone]    Sharlene Fermin a 56 y.o. female who presents today  for her physical.  Kalpesh Velasquez is c/o of   Chief Complaint   Patient presents with    Gynecologic Exam    Pharyngitis     Pt c/o sore throat, cough, congestion, and runny nose. X3-4 days. Pt taking tylenol qh6       HPI:     HPI    Contraception: menopause x 2 years. Vaginal pain and dryness off and on x 2-3 years. Wakes up with dry mouth  Itching on the outside of the urethra    Menstrual history:  OB History    Para Term  AB Living   2 1 1     1   SAB IAB Ectopic Molar Multiple Live Births                    # Outcome Date GA Lbr [place]/2nd Weight Sex Delivery Anes PTL Lv   2             1 Term                LDL Cholesterol (mg/dL)   Date Value   2019 95   2014 123 (H)       (goal LDL is <100)   No results found for: AST, ALT, BUN, CR    Past Medical History:   Diagnosis Date    Asthma     GERD (gastroesophageal reflux disease)     History of colon polyps 2019    tubular adenoma x2     Past Surgical History:   Procedure Laterality Date     SECTION      COLONOSCOPY N/A 2019    COLONOSCOPY POLYPECTOMY HOT SNARE performed by Radha Cevallos MD at [address] [place] [address] N/A 2019    ESOPHAGEAL CAPSULE ENDOSCOPY - EGD W/BRAVO PLACED AT 29CM, COLD BIOPSY performed by Radha Cevallos MD at [address] [place] [address] Right     right bottom       History reviewed. No pertinent family history. Social History     Tobacco Use    Smoking status: Never    Smokeless tobacco: Never   Substance Use Topics    Alcohol use: No     Alcohol/week: 0.0 standard drinks      Current Outpatient Medications   Medication Sig Dispense Refill    clobetasol (TEMOVATE) 0.05 % cream Apply topically 2 times daily.  15 g 0    escitalopram (LEXAPRO) 10 MG tablet Take 1 tablet by mouth once daily 30 tablet 4    famotidine (PEPCID) 20 MG tablet Take 1 tablet by mouth twice daily 60 tablet 5    montelukast (SINGULAIR) 10 MG tablet Take 1 tablet by mouth once daily 30 tablet 5    EQUATE STOOL SOFTENER 100 MG capsule TAKE 1 CAPSULE BY MOUTH TWICE DAILY HOLD  IF  DIARRHEA 60 capsule 5    hydrocortisone (ANUSOL-HC) 2.5 % CREA rectal cream Use BID in rectal area 28 g 3    Wheat Dextrin (BENEFIBER) POWD Take 4 g by mouth daily Increase to BID if needed for constipation 350 g 3    albuterol sulfate  (90 Base) MCG/ACT inhaler INHALE 2 PUFFS BY MOUTH EVERY 4 HOURS AS NEEDED FOR WHEEZING 18 g 3     No current facility-administered medications for this visit. Allergies   Allergen Reactions    Pantoprazole Other (See Comments)     Joint pains, face swelling, SOB, muscle spasms and weakness       Health Maintenance   Topic Date Due    HIV screen  Never done    Hepatitis C screen  Never done    Shingles vaccine (1 of 2) Never done    DTaP/Tdap/Td vaccine (2 - Td or Tdap) 04/05/2021    COVID-19 Vaccine (4 - Booster for Moderna series) 07/18/2022    Flu vaccine (1) 08/01/2022    Colorectal Cancer Screen  11/12/2022    Cervical cancer screen  01/28/2023    Depression Screen  06/07/2023    Lipids  03/05/2024    Breast cancer screen  05/10/2024    Hepatitis A vaccine  Aged Out    Hib vaccine  Aged Out    Meningococcal (ACWY) vaccine  Aged Out    Pneumococcal 0-64 years Vaccine  Aged Out       Subjective:     Review of Systems   Constitutional:  Positive for chills. HENT:          Runny  nose and mucous  Has been trying tylenol   Respiratory:  Positive for cough. Objective:     /66   Pulse 57   Ht 5' 5\" (1.651 m)   Wt 150 lb (68 kg)   LMP 11/25/2018   SpO2 98%   BMI 24.96 kg/m²   Physical Exam  Vitals and nursing note reviewed. Constitutional:       General: She is not in acute distress. Appearance: Normal appearance.  She is well-developed. She is not ill-appearing. HENT:      Head: Normocephalic and atraumatic. Right Ear: External ear normal.      Left Ear: External ear normal.   Eyes:      General: No scleral icterus. Right eye: No discharge. Left eye: No discharge. Conjunctiva/sclera: Conjunctivae normal.   Neck:      Thyroid: No thyromegaly. Trachea: No tracheal deviation. Cardiovascular:      Rate and Rhythm: Normal rate and regular rhythm. Heart sounds: Normal heart sounds. Pulmonary:      Effort: Pulmonary effort is normal. No respiratory distress. Breath sounds: Normal breath sounds. No wheezing. Genitourinary:     General: Normal vulva. Comments: Patient states there is itching on the left inferior vulva perineum, no rashes seen 2 small subcutaneous cyst are seen. Vagina has decreased rugae. The cervix is reddened and inflamed. There is a light yellow to white cervical discharge  Pap done and swabs for infection done  Musculoskeletal:      Right lower leg: No edema. Left lower leg: No edema. Lymphadenopathy:      Cervical: No cervical adenopathy. Skin:     General: Skin is warm. Findings: No rash. Neurological:      Mental Status: She is alert and oriented to person, place, and time. Psychiatric:         Mood and Affect: Mood normal.         Behavior: Behavior normal.       Assessment:       Diagnosis Orders   1. Encounter for gynecological examination without abnormal finding  PAP Smear      2. Upper respiratory tract infection, unspecified type  POCT Influenza A/B    COVID-19      3. Acute vaginitis  Vaginitis DNA Probe    Chlamydia/GC DNA, Thin Prep      4. Vulvar itching  clobetasol (TEMOVATE) 0.05 % cream      5. Screening for diabetes mellitus  Glucose, Fasting           Plan:      No follow-ups on file. Temovate cream for the itching on the left vulvar area  Patient is concerned about her dentist telling her she had decreased bone in her jaw. Information sheets on calcium to be given. Patient to try to get 1200 mg of calcium per day including what she gets in her diet. Information sheets on Pap and vaginitis also. As needed. Influenza test is negative COVID swab will be sent. Patient is also concerned about her mother who lives with her  Orders Placed This Encounter   Procedures    Vaginitis DNA Probe     Standing Status:   Future     Standing Expiration Date:   1/3/2024    [address] DNA, Thin Prep     Standing Status:   Future     Standing Expiration Date:   1/3/2024    PAP Smear     Patient History:    Patient's last menstrual period was 2018. OBGYN Status: Postmenopausal  Past Surgical History:  No date:  SECTION  2019: COLONOSCOPY; N/A      Comment:  COLONOSCOPY POLYPECTOMY HOT SNARE performed by Brooklyn Mejias MD at [facility]  2019: UPPER GASTROINTESTINAL ENDOSCOPY; N/A      Comment:  ESOPHAGEAL CAPSULE ENDOSCOPY - EGD W/BRAVO PLACED AT                29CM, COLD BIOPSY performed by [address], MD at               [facility]  No date: WISDOM TOOTH EXTRACTION; Right      Comment:  right bottom      Social History    Tobacco Use      Smoking status: Never      Smokeless tobacco: Never       Standing Status:   Future     Standing Expiration Date:   1/3/2024     Order Specific Question:   Collection Type     Answer: Thin Prep     Order Specific Question:   Prior Abnormal Pap Test     Answer:   No     Order Specific Question:   Screening or Diagnostic     Answer:   Screening     Order Specific Question:   HPV Requested? Answer:   Yes -  If ASCUS Reflex HPV     Order Specific Question:   High Risk Patient     Answer:   N/A    COVID-19     Standing Status:   Future     Standing Expiration Date:   1/3/2024     Scheduling Instructions:      1) Due to current limited availability of the COVID-19 test, tests will be prioritized based on responses to questions above.  Testing may be delayed due to volume. 2) Print and instruct patient to adhere to [facility] home isolation program. (Link Above)              3) Set up or refer patient for a monitoring program.              4) Have patient sign up for and leverage MyChart (if not previously done). Order Specific Question:   Pregnant? Answer:   No    Glucose, Fasting     Standing Status:   Future     Standing Expiration Date:   1/3/2024    POCT Influenza A/B     Orders Placed This Encounter   Medications    clobetasol (TEMOVATE) 0.05 % cream     Sig: Apply topically 2 times daily. Dispense:  15 g     Refill:  0       Patient given educational materials - see patient instructions. Discussed use, benefit, and side effects of prescribed medications. All patient questions answered. Pt voiced understanding. Reviewed health maintenance. Instructed to continue current medications, diet and exercise. Patient agreed with treatment plan. Follow up as directed.      Electronicallysigned by Bryan Tony MD on 1/3/2023 at 3:32 PM

## 2023-01-04 DIAGNOSIS — B96.89 BACTERIAL VAGINITIS: Primary | ICD-10-CM

## 2023-01-04 DIAGNOSIS — N76.0 BACTERIAL VAGINITIS: Primary | ICD-10-CM

## 2023-01-04 LAB
CANDIDA SPECIES, DNA PROBE: NEGATIVE
GARDNERELLA VAGINALIS, DNA PROBE: POSITIVE
SARS-COV-2: ABNORMAL
SARS-COV-2: DETECTED
SOURCE: ABNORMAL
SOURCE: ABNORMAL
TRICHOMONAS VAGINALIS DNA: NEGATIVE

## 2023-01-04 RX ORDER — METRONIDAZOLE 500 MG/1
500 TABLET ORAL 2 TIMES DAILY
Qty: 14 TABLET | Refills: 0 | Status: SHIPPED | OUTPATIENT
Start: 2023-01-04 | End: 2023-01-11

## 2023-01-04 NOTE — RESULT ENCOUNTER NOTE
+ gardnerella infection, normal vag bacteria that can overgrow  Start flagyl 500 mg BID x 7 days, do not drink any alcohol. Take with a lot of water.  Sometimes can upset stomach (0) none

## 2023-01-05 NOTE — RESULT ENCOUNTER NOTE
+ COVID test. Which means her mom probably is + . Will need to send in meds for her mom for covid . Please start encounter under her moms' name.

## 2023-01-06 LAB
CHLAMYDIA BY THIN PREP: NEGATIVE
N. GONORRHOEAE DNA, THIN PREP: NEGATIVE
SPECIMEN DESCRIPTION: NORMAL

## 2023-02-03 ENCOUNTER — OFFICE VISIT (OUTPATIENT)
Dept: GASTROENTEROLOGY | Age: 58
End: 2023-02-03
Payer: COMMERCIAL

## 2023-02-03 VITALS
SYSTOLIC BLOOD PRESSURE: 126 MMHG | HEIGHT: 65 IN | DIASTOLIC BLOOD PRESSURE: 75 MMHG | BODY MASS INDEX: 25.33 KG/M2 | WEIGHT: 152 LBS

## 2023-02-03 DIAGNOSIS — F41.9 ANXIETY: ICD-10-CM

## 2023-02-03 DIAGNOSIS — Z86.010 HISTORY OF COLON POLYPS: ICD-10-CM

## 2023-02-03 DIAGNOSIS — K21.9 CHRONIC GERD: ICD-10-CM

## 2023-02-03 DIAGNOSIS — L29.0 PRURITUS ANI: Primary | ICD-10-CM

## 2023-02-03 PROCEDURE — G8427 DOCREV CUR MEDS BY ELIG CLIN: HCPCS | Performed by: INTERNAL MEDICINE

## 2023-02-03 PROCEDURE — 1036F TOBACCO NON-USER: CPT | Performed by: INTERNAL MEDICINE

## 2023-02-03 PROCEDURE — G8484 FLU IMMUNIZE NO ADMIN: HCPCS | Performed by: INTERNAL MEDICINE

## 2023-02-03 PROCEDURE — 3017F COLORECTAL CA SCREEN DOC REV: CPT | Performed by: INTERNAL MEDICINE

## 2023-02-03 PROCEDURE — G8419 CALC BMI OUT NRM PARAM NOF/U: HCPCS | Performed by: INTERNAL MEDICINE

## 2023-02-03 PROCEDURE — 99204 OFFICE O/P NEW MOD 45 MIN: CPT | Performed by: INTERNAL MEDICINE

## 2023-02-03 RX ORDER — CITALOPRAM 10 MG/1
10 TABLET ORAL DAILY
COMMUNITY

## 2023-02-03 RX ORDER — ALBENDAZOLE 200 MG/1
400 TABLET, FILM COATED ORAL ONCE
Qty: 4 TABLET | Refills: 0 | Status: SHIPPED | OUTPATIENT
Start: 2023-02-03 | End: 2023-02-03

## 2023-02-03 ASSESSMENT — ENCOUNTER SYMPTOMS
RESPIRATORY NEGATIVE: 1
DIARRHEA: 1
CONSTIPATION: 1
ALLERGIC/IMMUNOLOGIC NEGATIVE: 1

## 2023-02-03 NOTE — PROGRESS NOTES
Reason for Referral:   No referring provider defined for this encounter. Chief Complaint   Patient presents with    Other     Pt states her rectum his itchy and swollen. Constipation     Pt states she has on and off constipation. 1. Pruritus ani    2. History of colon polyps    3. Chronic GERD    4. Anxiety            HISTORY OF PRESENT ILLNESS:   Patient seen with a history of pruritus ani for few months. Symptom appears to be worse at night. She does have constipation. No diarrhea. No rectal bleeding. Patient tried several medications not helping her. At present she has been taking Anusol, stool softeners are not helping. Previous records reviewed. She had a colonoscopy done in 2019 and at that time she was found to have a redundant sigmoid colon and 2 polyps, histology revealed tubular adenomas. Also at that time she had a EGD done, no significant esophagitis seen. However Bravo pH study revealed that patient has reflux episodes in upright position. At present patient has been on famotidine and has some relief of symptoms. No weight loss. Denies abdominal distention bloating, dysphagia etc.  Patient has significant stress and anxiety and she is on medications. Discussed with the patient regarding GERD symptoms and to follow antireflux measures. Regarding management of anxiety. At present patient states that her anxiety is better. Past Medical,Family, and Social History reviewed and does contribute to the patient presentingcondition. Patient's PMH/PSH,SH,PSYCH Hx, MEDs, ALLERGIES, and ROS were all reviewed and updated in the appropriate sections.     PAST MEDICAL HISTORY:  Past Medical History:   Diagnosis Date    Asthma     GERD (gastroesophageal reflux disease)     History of colon polyps 2019    tubular adenoma x2       Past Surgical History:   Procedure Laterality Date     SECTION      COLONOSCOPY N/A 2019    COLONOSCOPY POLYPECTOMY HOT SNARE performed by Felix Cisse MD at [facility] 11/12/2019    ESOPHAGEAL CAPSULE ENDOSCOPY - EGD W/BRAVO PLACED AT 29CM, COLD BIOPSY performed by Felix Cisse MD at [facility]     [facility]       CURRENT MEDICATIONS:    Current Outpatient Medications:     citalopram (CELEXA) 10 MG tablet, Take 10 mg by mouth daily, Disp: , Rfl:     albendazole (ALBENZA) 200 MG tablet, Take 2 tablets by mouth once for 1 dose To repeat the dose in 2 weeks, Disp: 4 tablet, Rfl: 0    clobetasol (TEMOVATE) 0.05 % cream, Apply topically 2 times daily. , Disp: 15 g, Rfl: 0    escitalopram (LEXAPRO) 10 MG tablet, Take 1 tablet by mouth once daily, Disp: 30 tablet, Rfl: 4    famotidine (PEPCID) 20 MG tablet, Take 1 tablet by mouth twice daily, Disp: 60 tablet, Rfl: 5    montelukast (SINGULAIR) 10 MG tablet, Take 1 tablet by mouth once daily, Disp: 30 tablet, Rfl: 5    EQUATE STOOL SOFTENER 100 MG capsule, TAKE 1 CAPSULE BY MOUTH TWICE DAILY HOLD  IF  DIARRHEA, Disp: 60 capsule, Rfl: 5    hydrocortisone (ANUSOL-HC) 2.5 % CREA rectal cream, Use BID in rectal area, Disp: 28 g, Rfl: 3    Wheat Dextrin (BENEFIBER) POWD, Take 4 g by mouth daily Increase to BID if needed for constipation, Disp: 350 g, Rfl: 3    albuterol sulfate  (90 Base) MCG/ACT inhaler, INHALE 2 PUFFS BY MOUTH EVERY 4 HOURS AS NEEDED FOR WHEEZING, Disp: 18 g, Rfl: 3    ALLERGIES:   Allergies   Allergen Reactions    Pantoprazole Other (See Comments)     Joint pains, face swelling, SOB, muscle spasms and weakness       FAMILY HISTORY: History reviewed. No pertinent family history.       SOCIAL HISTORY:   Social History     Socioeconomic History    Marital status:      Spouse name: Not on file    Number of children: Not on file    Years of education: Not on file    Highest education level: Not on file   Occupational History    Not on file   Tobacco Use    Smoking status: Never Smokeless tobacco: Never   Vaping Use    Vaping Use: Never used   Substance and Sexual Activity    Alcohol use: No     Alcohol/week: 0.0 standard drinks    Drug use: No    Sexual activity: Not on file   Other Topics Concern    Not on file   Social History Narrative    Not on file     Social Determinants of Health     Financial Resource Strain: Low Risk     Difficulty of Paying Living Expenses: Not hard at all   Food Insecurity: No Food Insecurity    Worried About Running Out of Food in the Last Year: Never true    Ran Out of Food in the Last Year: Never true   Transportation Needs: Not on file   Physical Activity: Not on file   Stress: Not on file   Social Connections: Not on file   Intimate Partner Violence: Not on file   Housing Stability: Not on file       REVIEW OF SYSTEMS:       Review of Systems   Constitutional: Negative. HENT: Negative. Eyes:  Positive for visual disturbance (glasses). Respiratory: Negative. Cardiovascular: Negative. Gastrointestinal:  Positive for constipation and diarrhea (sometimes). Endocrine: Negative. Genitourinary: Negative. Musculoskeletal: Negative. Skin: Negative. Allergic/Immunologic: Negative. Neurological: Negative. Hematological: Negative. Psychiatric/Behavioral: Negative. PHYSICAL EXAMINATION: Vital signs reviewed per the nursing documentation. /75   Ht 5' 5\" (1.651 m)   Wt 152 lb (68.9 kg)   LMP 11/25/2018   BMI 25.29 kg/m²   Body mass index is 25.29 kg/m². Physical Exam  Vitals and nursing note reviewed. Constitutional:       Appearance: She is well-developed. HENT:      Head: Normocephalic and atraumatic. Eyes:      General: No scleral icterus. Conjunctiva/sclera: Conjunctivae normal.      Pupils: Pupils are equal, round, and reactive to light. Neck:      Thyroid: No thyromegaly. Vascular: No hepatojugular reflux or JVD. Trachea: No tracheal deviation.    Cardiovascular:      Rate and Rhythm: Normal rate and regular rhythm. Heart sounds: Normal heart sounds. Pulmonary:      Effort: Pulmonary effort is normal. No respiratory distress. Breath sounds: Normal breath sounds. No wheezing or rales. Abdominal:      General: Bowel sounds are normal. There is no distension. Palpations: Abdomen is soft. There is no hepatomegaly or mass. Tenderness: There is no abdominal tenderness. There is no rebound. Hernia: No hernia is present. Genitourinary:     Rectum: Guaiac result negative. Comments: No signs of inflammation, infection in the perianal area. No external hemorrhoids. Musculoskeletal:         General: No tenderness. Cervical back: Normal range of motion and neck supple. Comments: No joint swelling   Lymphadenopathy:      Cervical: No cervical adenopathy. Skin:     General: Skin is warm. Findings: No bruising, ecchymosis, erythema or rash. Neurological:      Mental Status: She is alert and oriented to person, place, and time. Cranial Nerves: No cranial nerve deficit. Psychiatric:         Thought Content: Thought content normal.         LABORATORY DATA: Reviewed  No results found for: WBC, HGB, HCT, MCV, PLT, NA, K, CL, CO2, BUN, CREATININE, LABPROT, LABALBU, GGT, BILITOT, ALKPHOS, AST, ALT, INR      No results found for: RBC, HGB, MCV, MCH, MCHC, RDW, MPV, BASOPCT, LYMPHSABS, MONOSABS, NEUTROABS, EOSABS, BASOSABS      DIAGNOSTIC TESTING:     No results found. IMPRESSION: Ms. Juan David Eckert is a 56 y.o. female with     Assessment:  1. Pruritus ani    2. History of colon polyps    3. Chronic GERD    4. Anxiety        Plan:  Patient looks stable. Perianal examination did not reveal signs of infection in particular fungal infection. [place] in the last 4 [place] and she is from [facility].  I have a question whether this patient has Enterobius vermicularis infection causing itching.   I did discuss with the patient regarding possibility and empirically will treat with albendazole. /400 mg 4 tablets 2 tablets first time and after 2 weeks to take the rest of the 2 tablets. Local care and hygiene discussed. Discussed with the patient regarding constipation management and brochures given. Precautions not to strain at bowel movements. We will reevaluate her in the next 2 months. After discussion patient understood and agreed. Spent 30 minutes providing patient education and counseling. Thank you for allowing me to participate in the care of Ms. Pepe Irving. For any further questions please do not hesitate to contact me. Note is dictated utilizing voice recognition software. Unfortunately this leads to occasional typographical errors. Please contact our office if you have any questions. I have reviewed and agree with the MA/GAYATRIN ROS.      Meredith العراقي MD, [facility] Herrera [facility]  Board Certified in Gastroenterology and [address] [place] [address] Gastroenterology  Office #: [phone]

## 2023-03-04 DIAGNOSIS — K59.09 OTHER CONSTIPATION: ICD-10-CM

## 2023-03-06 RX ORDER — DOCUSATE SODIUM 100 MG/1
CAPSULE ORAL
Qty: 60 CAPSULE | Refills: 5 | Status: SHIPPED | OUTPATIENT
Start: 2023-03-06

## 2023-04-03 ENCOUNTER — OFFICE VISIT (OUTPATIENT)
Dept: GASTROENTEROLOGY | Age: 58
End: 2023-04-03
Payer: COMMERCIAL

## 2023-04-03 VITALS
HEIGHT: 65 IN | BODY MASS INDEX: 25.99 KG/M2 | WEIGHT: 156 LBS | DIASTOLIC BLOOD PRESSURE: 70 MMHG | SYSTOLIC BLOOD PRESSURE: 122 MMHG

## 2023-04-03 DIAGNOSIS — L29.0 PRURITUS ANI: ICD-10-CM

## 2023-04-03 DIAGNOSIS — Z86.010 H/O ADENOMATOUS POLYP OF COLON: Primary | ICD-10-CM

## 2023-04-03 DIAGNOSIS — K21.9 CHRONIC GERD: ICD-10-CM

## 2023-04-03 PROCEDURE — G8427 DOCREV CUR MEDS BY ELIG CLIN: HCPCS | Performed by: INTERNAL MEDICINE

## 2023-04-03 PROCEDURE — 3017F COLORECTAL CA SCREEN DOC REV: CPT | Performed by: INTERNAL MEDICINE

## 2023-04-03 PROCEDURE — 99213 OFFICE O/P EST LOW 20 MIN: CPT | Performed by: INTERNAL MEDICINE

## 2023-04-03 PROCEDURE — G8419 CALC BMI OUT NRM PARAM NOF/U: HCPCS | Performed by: INTERNAL MEDICINE

## 2023-04-03 PROCEDURE — 1036F TOBACCO NON-USER: CPT | Performed by: INTERNAL MEDICINE

## 2023-04-03 ASSESSMENT — ENCOUNTER SYMPTOMS
DIARRHEA: 1
RESPIRATORY NEGATIVE: 1
CONSTIPATION: 1
ALLERGIC/IMMUNOLOGIC NEGATIVE: 1

## 2023-04-03 NOTE — PROGRESS NOTES
Out of Food in the Last Year: Never true   Transportation Needs: Not on file   Physical Activity: Not on file   Stress: Not on file   Social Connections: Not on file   Intimate Partner Violence: Not on file   Housing Stability: Not on file         REVIEW OF SYSTEMS:         Review of Systems   Constitutional: Negative. HENT: Negative. Eyes:  Positive for visual disturbance (glasses). Respiratory: Negative. Cardiovascular: Negative. Gastrointestinal:  Positive for constipation and diarrhea (sometimes). Endocrine: Negative. Genitourinary: Negative. Musculoskeletal: Negative. Skin: Negative. Allergic/Immunologic: Negative. Neurological: Negative. Hematological: Negative. Psychiatric/Behavioral: Negative. PHYSICAL EXAMINATION:     Vital signs reviewed per the nursing documentation. /70   Ht 5' 5\" (1.651 m)   Wt 156 lb (70.8 kg)   LMP 11/01/2018 (Approximate)   BMI 25.96 kg/m²   Body mass index is 25.96 kg/m². Physical Exam  Vitals and nursing note reviewed. Constitutional:       Appearance: She is well-developed. HENT:      Head: Normocephalic and atraumatic. Eyes:      General: No scleral icterus. Conjunctiva/sclera: Conjunctivae normal.      Pupils: Pupils are equal, round, and reactive to light. Neck:      Thyroid: No thyromegaly. Vascular: No hepatojugular reflux or JVD. Trachea: No tracheal deviation. Cardiovascular:      Rate and Rhythm: Normal rate and regular rhythm. Heart sounds: Normal heart sounds. Pulmonary:      Effort: Pulmonary effort is normal. No respiratory distress. Breath sounds: Normal breath sounds. No wheezing or rales. Abdominal:      General: Bowel sounds are normal. There is no distension. Palpations: Abdomen is soft. There is no hepatomegaly or mass. Tenderness: There is no abdominal tenderness. There is no rebound. Hernia: No hernia is present.    Musculoskeletal:         General:

## 2023-04-04 ENCOUNTER — OFFICE VISIT (OUTPATIENT)
Dept: PRIMARY CARE CLINIC | Age: 58
End: 2023-04-04
Payer: COMMERCIAL

## 2023-04-04 VITALS
HEART RATE: 73 BPM | BODY MASS INDEX: 25.92 KG/M2 | WEIGHT: 155.6 LBS | DIASTOLIC BLOOD PRESSURE: 72 MMHG | OXYGEN SATURATION: 98 % | SYSTOLIC BLOOD PRESSURE: 124 MMHG | HEIGHT: 65 IN

## 2023-04-04 DIAGNOSIS — G89.29 CHRONIC LEFT-SIDED THORACIC BACK PAIN: Primary | ICD-10-CM

## 2023-04-04 DIAGNOSIS — M54.6 CHRONIC LEFT-SIDED THORACIC BACK PAIN: Primary | ICD-10-CM

## 2023-04-04 DIAGNOSIS — Z23 NEED FOR VACCINATION: ICD-10-CM

## 2023-04-04 DIAGNOSIS — M62.838 MUSCLE SPASM: ICD-10-CM

## 2023-04-04 DIAGNOSIS — M62.838 TRAPEZIUS MUSCLE SPASM: ICD-10-CM

## 2023-04-04 PROCEDURE — G8419 CALC BMI OUT NRM PARAM NOF/U: HCPCS | Performed by: FAMILY MEDICINE

## 2023-04-04 PROCEDURE — 90750 HZV VACC RECOMBINANT IM: CPT | Performed by: FAMILY MEDICINE

## 2023-04-04 PROCEDURE — G8427 DOCREV CUR MEDS BY ELIG CLIN: HCPCS | Performed by: FAMILY MEDICINE

## 2023-04-04 PROCEDURE — 1036F TOBACCO NON-USER: CPT | Performed by: FAMILY MEDICINE

## 2023-04-04 PROCEDURE — 3017F COLORECTAL CA SCREEN DOC REV: CPT | Performed by: FAMILY MEDICINE

## 2023-04-04 PROCEDURE — 99213 OFFICE O/P EST LOW 20 MIN: CPT | Performed by: FAMILY MEDICINE

## 2023-04-04 PROCEDURE — 90715 TDAP VACCINE 7 YRS/> IM: CPT | Performed by: FAMILY MEDICINE

## 2023-04-04 PROCEDURE — 90472 IMMUNIZATION ADMIN EACH ADD: CPT | Performed by: FAMILY MEDICINE

## 2023-04-04 PROCEDURE — 90471 IMMUNIZATION ADMIN: CPT | Performed by: FAMILY MEDICINE

## 2023-04-04 RX ORDER — POLYETHYLENE GLYCOL 3350 17 G/17G
POWDER, FOR SOLUTION ORAL
Qty: 238 G | Refills: 0 | Status: SHIPPED | OUTPATIENT
Start: 2023-04-04

## 2023-04-04 RX ORDER — LANSOPRAZOLE 30 MG/1
30 CAPSULE, DELAYED RELEASE ORAL DAILY
Qty: 30 CAPSULE | Refills: 5 | Status: SHIPPED | OUTPATIENT
Start: 2023-04-04 | End: 2023-04-05

## 2023-04-04 RX ORDER — BISACODYL 5 MG/1
TABLET, DELAYED RELEASE ORAL
Qty: 4 TABLET | Refills: 0 | Status: SHIPPED | OUTPATIENT
Start: 2023-04-04 | End: 2023-04-04

## 2023-04-04 SDOH — ECONOMIC STABILITY: HOUSING INSECURITY
IN THE LAST 12 MONTHS, WAS THERE A TIME WHEN YOU DID NOT HAVE A STEADY PLACE TO SLEEP OR SLEPT IN A SHELTER (INCLUDING NOW)?: NO

## 2023-04-04 SDOH — ECONOMIC STABILITY: FOOD INSECURITY: WITHIN THE PAST 12 MONTHS, YOU WORRIED THAT YOUR FOOD WOULD RUN OUT BEFORE YOU GOT MONEY TO BUY MORE.: NEVER TRUE

## 2023-04-04 SDOH — ECONOMIC STABILITY: INCOME INSECURITY: HOW HARD IS IT FOR YOU TO PAY FOR THE VERY BASICS LIKE FOOD, HOUSING, MEDICAL CARE, AND HEATING?: NOT HARD AT ALL

## 2023-04-04 SDOH — ECONOMIC STABILITY: FOOD INSECURITY: WITHIN THE PAST 12 MONTHS, THE FOOD YOU BOUGHT JUST DIDN'T LAST AND YOU DIDN'T HAVE MONEY TO GET MORE.: NEVER TRUE

## 2023-04-04 NOTE — PROGRESS NOTES
release capsule     Sig: Take 1 capsule by mouth daily     Dispense:  30 capsule     Refill:  5       Patient given educationalmaterials - see patient instructions. Discussed use, benefit, and side effectsof prescribed medications. All patient questions answered. Pt voiced understanding. Reviewed health maintenance. Instructed to continue current medications, diet andexercise. Patient agreed with treatment plan. Follow up as directed.      Electronicallysigned by India Reynolds MD on 4/4/2023 at 2:03 PM

## 2023-04-08 DIAGNOSIS — K21.9 GASTROESOPHAGEAL REFLUX DISEASE WITHOUT ESOPHAGITIS: ICD-10-CM

## 2023-04-10 RX ORDER — MONTELUKAST SODIUM 10 MG/1
TABLET ORAL
Qty: 30 TABLET | Refills: 4 | Status: SHIPPED | OUTPATIENT
Start: 2023-04-10

## 2023-04-10 RX ORDER — FAMOTIDINE 20 MG/1
TABLET, FILM COATED ORAL
Qty: 60 TABLET | Refills: 0 | OUTPATIENT
Start: 2023-04-10

## 2023-04-25 ENCOUNTER — HOSPITAL ENCOUNTER (OUTPATIENT)
Dept: PREADMISSION TESTING | Age: 58
Discharge: HOME | End: 2023-04-29

## 2023-04-25 VITALS — BODY MASS INDEX: 24.8 KG/M2 | WEIGHT: 140 LBS | HEIGHT: 63 IN

## 2023-05-05 ENCOUNTER — TELEPHONE (OUTPATIENT)
Dept: GASTROENTEROLOGY | Age: 58
End: 2023-05-05

## 2023-05-05 NOTE — TELEPHONE ENCOUNTER
Received call from [facility] stating that the patient does not have her prep. Informed that is because it never got picked up. Called the patient and LVM advising that the pharmacy received it on 4/4/23. To call them and have it pulled.

## 2023-05-08 ENCOUNTER — ANESTHESIA EVENT (OUTPATIENT)
Dept: ENDOSCOPY | Age: 58
End: 2023-05-08
Payer: COMMERCIAL

## 2023-05-09 ENCOUNTER — ANESTHESIA (OUTPATIENT)
Dept: ENDOSCOPY | Age: 58
End: 2023-05-09
Payer: COMMERCIAL

## 2023-05-09 ENCOUNTER — HOSPITAL ENCOUNTER (OUTPATIENT)
Age: 58
Setting detail: OUTPATIENT SURGERY
Discharge: HOME | End: 2023-05-09
Attending: INTERNAL MEDICINE | Admitting: INTERNAL MEDICINE
Payer: COMMERCIAL

## 2023-05-09 VITALS
DIASTOLIC BLOOD PRESSURE: 66 MMHG | WEIGHT: 140 LBS | HEIGHT: 63 IN | OXYGEN SATURATION: 100 % | SYSTOLIC BLOOD PRESSURE: 104 MMHG | HEART RATE: 50 BPM | TEMPERATURE: 97 F | BODY MASS INDEX: 24.8 KG/M2 | RESPIRATION RATE: 17 BRPM

## 2023-05-09 DIAGNOSIS — Z86.010 HX OF ADENOMATOUS POLYP OF COLON: ICD-10-CM

## 2023-05-09 PROCEDURE — 3700000001 HC ADD 15 MINUTES (ANESTHESIA): Performed by: INTERNAL MEDICINE

## 2023-05-09 PROCEDURE — 2709999900 HC NON-CHARGEABLE SUPPLY: Performed by: INTERNAL MEDICINE

## 2023-05-09 PROCEDURE — 2500000003 HC RX 250 WO HCPCS: Performed by: ANESTHESIOLOGY

## 2023-05-09 PROCEDURE — 2500000003 HC RX 250 WO HCPCS: Performed by: NURSE ANESTHETIST, CERTIFIED REGISTERED

## 2023-05-09 PROCEDURE — 45380 COLONOSCOPY AND BIOPSY: CPT | Performed by: INTERNAL MEDICINE

## 2023-05-09 PROCEDURE — 88305 TISSUE EXAM BY PATHOLOGIST: CPT

## 2023-05-09 PROCEDURE — 6360000002 HC RX W HCPCS: Performed by: NURSE ANESTHETIST, CERTIFIED REGISTERED

## 2023-05-09 PROCEDURE — 3609010300 HC COLONOSCOPY W/BIOPSY SINGLE/MULTIPLE: Performed by: INTERNAL MEDICINE

## 2023-05-09 PROCEDURE — 3700000000 HC ANESTHESIA ATTENDED CARE: Performed by: INTERNAL MEDICINE

## 2023-05-09 PROCEDURE — 7100000010 HC PHASE II RECOVERY - FIRST 15 MIN: Performed by: INTERNAL MEDICINE

## 2023-05-09 PROCEDURE — 2580000003 HC RX 258: Performed by: ANESTHESIOLOGY

## 2023-05-09 PROCEDURE — 7100000011 HC PHASE II RECOVERY - ADDTL 15 MIN: Performed by: INTERNAL MEDICINE

## 2023-05-09 RX ORDER — PROPOFOL 10 MG/ML
INJECTION, EMULSION INTRAVENOUS PRN
Status: DISCONTINUED | OUTPATIENT
Start: 2023-05-09 | End: 2023-05-09 | Stop reason: SDUPTHER

## 2023-05-09 RX ORDER — SODIUM CHLORIDE, SODIUM LACTATE, POTASSIUM CHLORIDE, CALCIUM CHLORIDE 600; 310; 30; 20 MG/100ML; MG/100ML; MG/100ML; MG/100ML
INJECTION, SOLUTION INTRAVENOUS CONTINUOUS
Status: DISCONTINUED | OUTPATIENT
Start: 2023-05-09 | End: 2023-05-09 | Stop reason: HOSPADM

## 2023-05-09 RX ORDER — SODIUM CHLORIDE 0.9 % (FLUSH) 0.9 %
5-40 SYRINGE (ML) INJECTION EVERY 12 HOURS SCHEDULED
Status: DISCONTINUED | OUTPATIENT
Start: 2023-05-09 | End: 2023-05-09 | Stop reason: HOSPADM

## 2023-05-09 RX ORDER — SODIUM CHLORIDE 0.9 % (FLUSH) 0.9 %
5-40 SYRINGE (ML) INJECTION PRN
Status: DISCONTINUED | OUTPATIENT
Start: 2023-05-09 | End: 2023-05-09 | Stop reason: HOSPADM

## 2023-05-09 RX ORDER — LIDOCAINE HYDROCHLORIDE 10 MG/ML
1 INJECTION, SOLUTION EPIDURAL; INFILTRATION; INTRACAUDAL; PERINEURAL
Status: COMPLETED | OUTPATIENT
Start: 2023-05-09 | End: 2023-05-09

## 2023-05-09 RX ORDER — SODIUM CHLORIDE 9 MG/ML
INJECTION, SOLUTION INTRAVENOUS PRN
Status: DISCONTINUED | OUTPATIENT
Start: 2023-05-09 | End: 2023-05-09 | Stop reason: HOSPADM

## 2023-05-09 RX ORDER — LIDOCAINE HYDROCHLORIDE 10 MG/ML
INJECTION, SOLUTION EPIDURAL; INFILTRATION; INTRACAUDAL; PERINEURAL PRN
Status: DISCONTINUED | OUTPATIENT
Start: 2023-05-09 | End: 2023-05-09 | Stop reason: SDUPTHER

## 2023-05-09 RX ADMIN — PROPOFOL 100 MG: 10 INJECTION, EMULSION INTRAVENOUS at 08:59

## 2023-05-09 RX ADMIN — PROPOFOL 125 MCG/KG/MIN: 10 INJECTION, EMULSION INTRAVENOUS at 08:59

## 2023-05-09 RX ADMIN — LIDOCAINE HYDROCHLORIDE 20 MG: 10 INJECTION, SOLUTION EPIDURAL; INFILTRATION; INTRACAUDAL; PERINEURAL at 08:59

## 2023-05-09 RX ADMIN — SODIUM CHLORIDE, POTASSIUM CHLORIDE, SODIUM LACTATE AND CALCIUM CHLORIDE: 600; 310; 30; 20 INJECTION, SOLUTION INTRAVENOUS at 08:23

## 2023-05-09 RX ADMIN — PROPOFOL 40 MG: 10 INJECTION, EMULSION INTRAVENOUS at 09:02

## 2023-05-09 RX ADMIN — LIDOCAINE HYDROCHLORIDE 1 ML: 10 INJECTION, SOLUTION EPIDURAL; INFILTRATION; INTRACAUDAL; PERINEURAL at 08:20

## 2023-05-09 ASSESSMENT — PAIN - FUNCTIONAL ASSESSMENT: PAIN_FUNCTIONAL_ASSESSMENT: 0-10

## 2023-05-09 ASSESSMENT — PAIN SCALES - GENERAL
PAINLEVEL_OUTOF10: 0

## 2023-05-09 NOTE — ANESTHESIA PRE PROCEDURE
tobacco: Never   Substance Use Topics    Alcohol use: No     Alcohol/week: 0.0 standard drinks                                Counseling given: Not Answered      Vital Signs (Current):   Vitals:    05/09/23 0808   BP: 123/71   Pulse: 58   Resp: 14   Temp: 98.6 °F (37 °C)   TempSrc: Infrared   SpO2: 99%   Weight: 140 lb (63.5 kg)   Height: 5' 3\" (1.6 m)                                              BP Readings from Last 3 Encounters:   05/09/23 123/71   04/04/23 124/72   04/03/23 122/70       NPO Status: Time of last liquid consumption: 2359                        Time of last solid consumption: 2359                        Date of last liquid consumption: 05/08/23                        Date of last solid food consumption: 05/07/23    BMI:   Wt Readings from Last 3 Encounters:   05/09/23 140 lb (63.5 kg)   04/25/23 140 lb (63.5 kg)   04/04/23 155 lb 9.6 oz (70.6 kg)     Body mass index is 24.8 kg/m². CBC: No results found for: WBC, RBC, HGB, HCT, MCV, RDW, PLT    CMP:   Lab Results   Component Value Date/Time    GLUCOSE 163 01/28/2020 04:23 PM       POC Tests: No results for input(s): POCGLU, POCNA, POCK, POCCL, POCBUN, POCHEMO, POCHCT in the last 72 hours.     Coags: No results found for: PROTIME, INR, APTT    HCG (If Applicable): No results found for: PREGTESTUR, PREGSERUM, HCG, HCGQUANT     ABGs: No results found for: PHART, PO2ART, NNV2IWX, XPH4HCF, BEART, [id number]     Type & Screen (If Applicable):  No results found for: LABABO, LABRH    Drug/Infectious Status (If Applicable):  No results found for: HIV, HEPCAB    COVID-19 Screening (If Applicable):   Lab Results   Component Value Date/Time    COVID19 DETECTED 01/03/2023 11:06 AM           Anesthesia Evaluation  Patient summary reviewed and Nursing notes reviewed no history of anesthetic complications:   Airway: Mallampati: II  TM distance: >3 FB   Neck ROM: full  Mouth opening: > = 3 FB   Dental: normal exam         Pulmonary:normal exam  breath sounds clear

## 2023-05-09 NOTE — DISCHARGE INSTRUCTIONS
To see in the office in the next 3 weeks        Colonoscopy: What to Expect at [address] [place] [address]  After you have a colonoscopy, you will stay at the clinic for 1 to 2 hours until the medicines wear off. Then you can go home, but you will need to arrange for a ride. Your doctor will tell you when you can eat and do your other usual activities. Your doctor will talk to you about when you will need your next colonoscopy. The results of your test and your risk for colorectal cancer will help your doctor decide how often you need to be checked. After the test, you may be bloated or have gas pains. You may need to pass gas. If a biopsy was done or a polyp was removed, you may have streaks of blood in your stool (feces) for a few days. This care sheet gives you a general idea about how long it will take for you to recover. But each person recovers at a different pace. Follow the steps below to get better as quickly as possible. How can you care for yourself at home? Activity  Rest as much as you need to after you go home. You should be able to go back to your usual activities the day after the test.  Diet  Follow your doctors directions for eating. Drink plenty of fluids (unless your doctor has told you not to) to replace the fluids that were lost during the colon prep. Do not drink alcohol. Medicines  If polyps were removed or a biopsy was done during the test, your doctor may tell you not to take aspirin or other anti-inflammatory medicines, such as ibuprofen (Advil, Motrin) and naproxen (Aleve), for a few days. Other instructions  For your safety, you should not drive or operate machinery until the medicine effects are gone and you can think clearly. Your doctor may tell you not to drive or operate machinery until the day after your test.  Do not sign legal documents or make major decisions until the medicine effects are gone and you can think clearly.  The anesthesia medicine can make it hard for you to

## 2023-05-09 NOTE — H&P
HISTORY and [address]       NAME:  Klaus Rudolph  MRN: [id number]   YOB: 1965   Date: 5/9/2023   Age: 56 y.o. Gender: female       COMPLAINT AND PRESENT HISTORY:     Klaus Rudolph is 56 y.o.,   female, having a Diagnostic Colonoscopy, for hx of: Pre-Op Diagnosis Codes:     * Hx of adenomatous polyp of colon      Prior Colonoscopy was done last in 2019  with polypectomy. Patient has hx of tubular adenoma x2. Patient states that she has right sided and lower abdominal pain intermittently. including bloating/gas. Patient states that her left side around to the back hurts her most of the time she states if she is hungry or unable to have a bowel movement it is aggravated. She rates her pain at 6/10. No changes in bowel habits. No diarrhea, constipation. Pt mentions that approx 10 days ago she saw bright red blood in stools, in the toilet. She admits to having dark  stools at times. Pt is on laxatives and fiber. No black tarry stools. No changes in appetite and unintended weight loss. Denies any nausea or vomiting. Pt admits to heartburn, indigestion or acid reflux. Often Pt is not taking anything. No positive Family Hx     Any significant  medical conditions: Asthma    Patient voices feeling well today. Denies any recent fever or chills,  pt admits to having chest pain  and SOB. Periodically. Pt denies any present, but was advised to tell her provider to r/o cardiac. Patient denies any personal history of blood clots. Functional Capacity per patient:              1. Patient is not able to walk 2 city blocks on level ground without SOB. 2. Patient is not able to climb 2 flights of stairs without SOB. Any anticoagulants or blood thinners: no    Patient has been NPO since midnight. No blood thinners in the past  5-7 days. Patient states has taken all bowel prep with clear outcome.     Patient denies any personal or family

## 2023-05-09 NOTE — DISCHARGE INSTR - DIET
Good nutrition is important when healing from an illness, injury, or surgery. Follow any nutrition recommendations given to you during your hospital stay. If you were given an oral nutrition supplement while in the hospital, continue to take this supplement at home. You can take it with meals, in-between meals, and/or before bedtime. These supplements can be purchased at most local grocery stores, pharmacies, and chain [employer]-stores. If you have any questions about your diet or nutrition, call the hospital and ask for the dietitian. High-Fiber Diet     What Is Fiber? Dietary fiber is a form of carbohydrate found in plants that cannot be digested by humans. All plants contain fiber, including fruits, vegetables, grains, and legumes. Fiber is often classified into two categories: soluble and insoluble. Soluble fiber draws water into the bowel and can help slow digestion. Examples of foods that are high in soluble fiber include oatmeal, oat bran, barley, legumes (eg, beans and peas), apples, and strawberries. Insoluble fiber speeds digestion and can add bulk to the stool. Examples of foods that are high in insoluble fiber include whole-wheat products, wheat bran, cauliflower, green beans, and potatoes. Why Follow a High-Fiber Diet? A high-fiber diet is often recommended to prevent and treat constipation , hemorrhoids , diverticulitis , and irritable bowel syndrome . Eating a high-fiber diet can also help improve your cholesterol levels, lower your risk of coronary heart disease , reduce your risk of type 2 diabetes , and lower your weight. For people with type 1 or 2 diabetes, a high-fiber diet can also help stabilize blood sugar levels. How Much Fiber Should I Eat? A high-fiber diet should contain  20-35 grams  of fiber a day. This is actually the amount recommended for the general adult population; however, most Americans eat only 15 grams of fiber per day.    Digestion of Fiber   Eating a higher

## 2023-05-09 NOTE — ANESTHESIA POSTPROCEDURE EVALUATION
Department of Anesthesiology  Postprocedure Note    Patient: Ivette Santo  MRN: [id number]  YOB: 1965  Date of evaluation: 5/9/2023      Procedure Summary     Date: 05/09/23 Room / Location: [facility] ENDO 02 / [facility] ENDO    Anesthesia Start: 3565 Anesthesia Stop: 6477    Procedure: COLONOSCOPY WITH BIOPSY Diagnosis:       Hx of adenomatous polyp of colon      (Hx of adenomatous polyp of colon [Z86.010])    Surgeons: Teodoro Ervin MD Responsible Provider: Hermelinda Nayak MD    Anesthesia Type: general ASA Status: 2          Anesthesia Type: No value filed.     Gus Phase I:      Gus Phase II: Gus Score: 10      Anesthesia Post Evaluation    Comments: POST- ANESTHESIA EVALUATION       Pt Name: Ivette Santo  MRN: [id number]  [place]: 1965  Date of evaluation: 5/9/2023  Time:  11:34 AM      /66   Pulse 50   Temp 97 °F (36.1 °C)   Resp 17   Ht 5' 3\" (1.6 m)   Wt 140 lb (63.5 kg)   LMP 11/01/2018 (Approximate)   SpO2 100%   BMI 24.80 kg/m²      Consciousness Level  Awake  Cardiopulmonary Status  Stable  Pain Adequately Treated YES  Nausea / Vomiting  NO  Adequate Hydration  YES  Anesthesia Related Complications NONE      Electronically signed by Hermelinda Nayak MD on 5/9/2023 at 11:34 AM

## 2023-05-09 NOTE — OP NOTE
COLONOSCOPY    DATE OF PROCEDURE: 5/9/2023    SURGEON: Evelia Orr MD    ASSISTANT: None    PREOPERATIVE DIAGNOSIS: Patient has history of colon polyps removed about 4 years ago. At that time patient has fair preparation. Procedure performed to evaluate residual polyps, recurrent polyps    POSTOPERATIVE DIAGNOSIS: Polyps as described. OPERATION: Total colonoscopy, excision of polyp with biopsy forceps    ANESTHESIA: MAC    ESTIMATED BLOOD LOSS: None    COMPLICATIONS: None     SPECIMENS:  Was Obtained: Questionable cyst in the upper part of the sigmoid colon biopsied. Polyp in the lower part of the colon excised with biopsy forceps    HISTORY: The patient is a 56y.o. year old female with history of above preop diagnosis. I recommended colonoscopy with possible biopsy or polypectomy and I explained the risk, benefits, expected outcome, and alternatives to the procedure. Risks included but are not limited to bleeding, infection, respiratory distress, hypotension, and perforation of the colon and possibility of missing a lesion. The patient understands and is in agreement. PROCEDURE:  The patient's SPO2 remained above 90% throughout the procedure. Digital rectal exam was normal.  The colonoscope was inserted through the anus into the rectum and advanced under direct vision to the cecum without difficulty. The prep was good. Findings:      Cecum/Ascending colon: normal    Transverse colon: normal    Descending/Sigmoid colon: abnormal: In the upper part of the sigmoid colon there was a small cystlike lesion seen 5 mm. This is biopsied. After the biopsy it appears it is collapsed. In the lower part of the sigmoid colon at about 35 cm from the anus, a polyp which is about 5 mm, flat, excised with biopsy forceps    Rectum/Anus: examined in normal and retroflexed positions and was normal    Withdrawal Time was (minutes): 12          The colon was decompressed and the scope was removed.

## 2023-05-10 LAB — SURGICAL PATHOLOGY REPORT: NORMAL

## 2023-07-07 ENCOUNTER — OFFICE VISIT (OUTPATIENT)
Dept: PRIMARY CARE CLINIC | Age: 58
End: 2023-07-07
Payer: COMMERCIAL

## 2023-07-07 VITALS
SYSTOLIC BLOOD PRESSURE: 130 MMHG | HEART RATE: 51 BPM | OXYGEN SATURATION: 99 % | DIASTOLIC BLOOD PRESSURE: 80 MMHG | WEIGHT: 153 LBS | BODY MASS INDEX: 27.11 KG/M2 | HEIGHT: 63 IN

## 2023-07-07 DIAGNOSIS — F43.9 STRESS: ICD-10-CM

## 2023-07-07 DIAGNOSIS — K21.9 GASTROESOPHAGEAL REFLUX DISEASE WITHOUT ESOPHAGITIS: ICD-10-CM

## 2023-07-07 DIAGNOSIS — Z79.899 MEDICATION MANAGEMENT: Primary | ICD-10-CM

## 2023-07-07 DIAGNOSIS — M47.816 ARTHRITIS OF LUMBAR SPINE: ICD-10-CM

## 2023-07-07 PROCEDURE — G8419 CALC BMI OUT NRM PARAM NOF/U: HCPCS | Performed by: FAMILY MEDICINE

## 2023-07-07 PROCEDURE — 99214 OFFICE O/P EST MOD 30 MIN: CPT | Performed by: FAMILY MEDICINE

## 2023-07-07 PROCEDURE — 3017F COLORECTAL CA SCREEN DOC REV: CPT | Performed by: FAMILY MEDICINE

## 2023-07-07 PROCEDURE — 1036F TOBACCO NON-USER: CPT | Performed by: FAMILY MEDICINE

## 2023-07-07 PROCEDURE — G8427 DOCREV CUR MEDS BY ELIG CLIN: HCPCS | Performed by: FAMILY MEDICINE

## 2023-07-07 RX ORDER — MELOXICAM 15 MG/1
15 TABLET ORAL DAILY PRN
Qty: 30 TABLET | Refills: 0 | Status: SHIPPED | OUTPATIENT
Start: 2023-07-07

## 2023-07-07 NOTE — PROGRESS NOTES
ENDOSCOPY - EGD W/BRAVO PLACED AT 29CM, COLD BIOPSY performed by Kristal Lopez MD at [address] Right     right bottom       Family History   Problem Relation Age of Onset    Liver Cancer Paternal Uncle        Social History     Tobacco Use    Smoking status: Never    Smokeless tobacco: Never   Substance Use Topics    Alcohol use: No     Alcohol/week: 0.0 standard drinks      Current Outpatient Medications   Medication Sig Dispense Refill    meloxicam (MOBIC) 15 MG tablet Take 1 tablet by mouth daily as needed for Pain (back pain, left hip pain) 30 tablet 0    escitalopram (LEXAPRO) 10 MG tablet Take 1 tablet by mouth once daily 30 tablet 5    montelukast (SINGULAIR) 10 MG tablet Take 1 tablet by mouth once daily 30 tablet 4    omeprazole (PRILOSEC) 40 MG delayed release capsule Take 1 capsule by mouth every morning (before breakfast) 30 capsule 5    EQUATE STOOL SOFTENER 100 MG capsule TAKE 1 CAPSULE BY MOUTH TWICE DAILY HOLD  IF  DIARRHEA 60 capsule 5    clobetasol (TEMOVATE) 0.05 % cream Apply topically 2 times daily. 15 g 0    hydrocortisone (ANUSOL-HC) 2.5 % CREA rectal cream Use BID in rectal area 28 g 3    albuterol sulfate  (90 Base) MCG/ACT inhaler INHALE 2 PUFFS BY MOUTH EVERY 4 HOURS AS NEEDED FOR WHEEZING 18 g 3     No current facility-administered medications for this visit.      Allergies   Allergen Reactions    Pantoprazole Other (See Comments)     Joint pains, face swelling, SOB, muscle spasms and weakness       Health Maintenance   Topic Date Due    HIV screen  Never done    Hepatitis C screen  Never done    COVID-19 Vaccine (4 - Booster for Moderna series) 07/18/2022    Shingles vaccine (2 of 2) 05/30/2023    Flu vaccine (1) 08/01/2023    Depression Screen  01/03/2024    Lipids  03/05/2024    Breast cancer screen  05/10/2024    Diabetes screen  06/14/2025    Cervical cancer screen  01/03/2026    Colorectal Cancer Screen  05/09/2026    DTaP/Tdap/Td vaccine (3 -

## 2023-08-02 DIAGNOSIS — M47.816 ARTHRITIS OF LUMBAR SPINE: ICD-10-CM

## 2023-08-03 RX ORDER — MELOXICAM 15 MG/1
TABLET ORAL
Qty: 30 TABLET | Refills: 2 | Status: SHIPPED | OUTPATIENT
Start: 2023-08-03

## 2023-08-31 RX ORDER — MONTELUKAST SODIUM 10 MG/1
TABLET ORAL
Qty: 30 TABLET | Refills: 5 | Status: SHIPPED | OUTPATIENT
Start: 2023-08-31

## 2023-09-18 DIAGNOSIS — K59.09 OTHER CONSTIPATION: ICD-10-CM

## 2023-09-19 RX ORDER — DOCUSATE SODIUM 100 MG/1
CAPSULE, LIQUID FILLED ORAL
Qty: 60 CAPSULE | Refills: 0 | Status: SHIPPED | OUTPATIENT
Start: 2023-09-19

## 2023-09-27 DIAGNOSIS — F43.9 STRESS: ICD-10-CM

## 2023-09-27 RX ORDER — ESCITALOPRAM OXALATE 10 MG/1
TABLET ORAL
Qty: 30 TABLET | Refills: 5 | Status: SHIPPED | OUTPATIENT
Start: 2023-09-27

## 2023-09-27 NOTE — TELEPHONE ENCOUNTER
Please Approve or Refuse.   Send to Pharmacy per Pt's Request:      Next Visit Date:  11/2/2023   Last Visit Date: 7/7/2023    Hemoglobin A1C (%)   Date Value   01/28/2020 5.3             ( goal A1C is < 7)   BP Readings from Last 3 Encounters:   07/07/23 130/80   05/09/23 104/66   04/04/23 124/72          (goal 120/80)  No results found for: \"BUN\"  No results found for: \"CREATININE\"  No results found for: \"K\"

## 2023-10-11 RX ORDER — OMEPRAZOLE 40 MG/1
CAPSULE, DELAYED RELEASE ORAL
Qty: 30 CAPSULE | Refills: 3 | Status: SHIPPED | OUTPATIENT
Start: 2023-10-11

## 2023-10-11 NOTE — TELEPHONE ENCOUNTER
LAST VISIT:   7/7/2023     Future Appointments   Date Time Provider [address]   11/2/2023  1:20 PM Qian Burr MD [facility]

## 2023-10-30 DIAGNOSIS — K59.09 OTHER CONSTIPATION: ICD-10-CM

## 2023-10-30 NOTE — TELEPHONE ENCOUNTER
LAST VISIT:   7/7/2023     Future Appointments   Date Time Provider [address]   11/2/2023  1:20 PM MD KALEIGH Stephenson

## 2023-10-31 RX ORDER — DOCUSATE SODIUM 100 MG/1
CAPSULE, LIQUID FILLED ORAL
Qty: 60 CAPSULE | Refills: 0 | Status: SHIPPED | OUTPATIENT
Start: 2023-10-31

## 2023-11-02 ENCOUNTER — OFFICE VISIT (OUTPATIENT)
Dept: PRIMARY CARE CLINIC | Age: 58
End: 2023-11-02
Payer: COMMERCIAL

## 2023-11-02 VITALS
WEIGHT: 157 LBS | DIASTOLIC BLOOD PRESSURE: 62 MMHG | OXYGEN SATURATION: 99 % | BODY MASS INDEX: 27.82 KG/M2 | HEART RATE: 70 BPM | SYSTOLIC BLOOD PRESSURE: 100 MMHG | HEIGHT: 63 IN

## 2023-11-02 DIAGNOSIS — R06.09 DYSPNEA ON EXERTION: ICD-10-CM

## 2023-11-02 DIAGNOSIS — K59.09 OTHER CONSTIPATION: ICD-10-CM

## 2023-11-02 DIAGNOSIS — M47.816 ARTHRITIS OF LUMBAR SPINE: ICD-10-CM

## 2023-11-02 DIAGNOSIS — Z00.00 ENCOUNTER FOR WELL ADULT EXAM WITHOUT ABNORMAL FINDINGS: Primary | ICD-10-CM

## 2023-11-02 DIAGNOSIS — Z23 NEED FOR VACCINATION: ICD-10-CM

## 2023-11-02 DIAGNOSIS — Z79.899 MEDICATION MANAGEMENT: ICD-10-CM

## 2023-11-02 DIAGNOSIS — K21.9 GASTROESOPHAGEAL REFLUX DISEASE, UNSPECIFIED WHETHER ESOPHAGITIS PRESENT: ICD-10-CM

## 2023-11-02 PROCEDURE — 99396 PREV VISIT EST AGE 40-64: CPT | Performed by: FAMILY MEDICINE

## 2023-11-02 PROCEDURE — 90674 CCIIV4 VAC NO PRSV 0.5 ML IM: CPT | Performed by: FAMILY MEDICINE

## 2023-11-02 PROCEDURE — G8482 FLU IMMUNIZE ORDER/ADMIN: HCPCS | Performed by: FAMILY MEDICINE

## 2023-11-02 PROCEDURE — 90471 IMMUNIZATION ADMIN: CPT | Performed by: FAMILY MEDICINE

## 2023-11-02 RX ORDER — HYDROCORTISONE 25 MG/G
CREAM TOPICAL
Qty: 28 G | Refills: 3 | Status: SHIPPED | OUTPATIENT
Start: 2023-11-02

## 2023-11-02 ASSESSMENT — ENCOUNTER SYMPTOMS: SHORTNESS OF BREATH: 1

## 2023-11-02 NOTE — PROGRESS NOTES
Well Adult Note  Name: Chelsea Lacey Date: 2023   MRN: [id number] Sex: Female   Age: 66 y.o. Ethnicity: Non- / Non    : 1965 Race: Other       [facility] Krupa is here for well adult exam.  History:  Diet eats healthy diet  Exercise : walking on treadmill for an hour nightly. Review of Systems   Constitutional: Negative. Respiratory:  Positive for shortness of breath (when she rushes). She does not have SOB when she takes her time on the treadmilll  She states she does NOT get hot flashes    She has nocturia up to 5 x but if she eats yogurt and cereal in the evening she only goes once for urination at . She tries to eat correctly to keep her bowels moving regularly. Has second hand smoke at home. Gets rectal itching off and on. Cream does help. Allergies   Allergen Reactions    Pantoprazole Other (See Comments)     Joint pains, face swelling, SOB, muscle spasms and weakness         Prior to Visit Medications    Medication Sig Taking? Authorizing Provider   hydrocortisone (ANUSOL-HC) 2.5 % CREA rectal cream Use BID in rectal area Yes Magi Domínguez MD   EQUATE STOOL SOFTENER 100 MG capsule TAKE 1 CAPSULE BY MOUTH TWICE DAILY (DO  NOT  TAKE  IF  EXPERIENCING  DIARRHEA) Yes Magi Domínguez MD   omeprazole (PRILOSEC) 40 MG delayed release capsule TAKE 1 CAPSULE BY MOUTH ONCE DAILY IN THE MORNING BEFORE BREAKFAST Yes Magi Domínguez MD   escitalopram (LEXAPRO) 10 MG tablet Take 1 tablet by mouth once daily Yes Magi Domínguez MD   montelukast (SINGULAIR) 10 MG tablet Take 1 tablet by mouth once daily Yes Magi Domínguez MD   meloxicam (MOBIC) 15 MG tablet TAKE 1 TABLET BY MOUTH ONCE DAILY AS NEEDED FOR  BACK  AND/OR  LEFT  HIP  PAIN Yes Magi Domínguez MD   clobetasol (TEMOVATE) 0.05 % cream Apply topically 2 times daily.  Yes Magi Domínguez MD   albuterol sulfate  (90 Base) MCG/ACT inhaler INHALE 2 PUFFS BY MOUTH EVERY 4 HOURS AS NEEDED FOR

## 2023-11-07 ENCOUNTER — HOSPITAL ENCOUNTER (OUTPATIENT)
Dept: GENERAL RADIOLOGY | Age: 58
Discharge: HOME | End: 2023-11-09
Payer: COMMERCIAL

## 2023-11-07 ENCOUNTER — HOSPITAL ENCOUNTER (OUTPATIENT)
Age: 58
Discharge: HOME | End: 2023-11-09
Payer: COMMERCIAL

## 2023-11-07 DIAGNOSIS — R06.09 DYSPNEA ON EXERTION: ICD-10-CM

## 2023-11-07 PROCEDURE — 71046 X-RAY EXAM CHEST 2 VIEWS: CPT

## 2023-11-28 DIAGNOSIS — Z86.010 H/O ADENOMATOUS POLYP OF COLON: ICD-10-CM

## 2023-12-02 DIAGNOSIS — K59.09 OTHER CONSTIPATION: ICD-10-CM

## 2023-12-05 RX ORDER — DOCUSATE SODIUM 100 MG/1
CAPSULE, LIQUID FILLED ORAL
Qty: 60 CAPSULE | Refills: 0 | Status: SHIPPED | OUTPATIENT
Start: 2023-12-05

## 2023-12-07 ENCOUNTER — TELEPHONE (OUTPATIENT)
Dept: GASTROENTEROLOGY | Age: 58
End: 2023-12-07

## 2024-01-02 DIAGNOSIS — K59.09 OTHER CONSTIPATION: ICD-10-CM

## 2024-01-02 NOTE — TELEPHONE ENCOUNTER
LAST VISIT:   11/2/2023     Future Appointments   Date Time Provider Department Center   1/3/2024  9:20 AM Jordyn Razo MD [facility]   5/8/2024  8:00 AM Jordyn Razo MD [facility]

## 2024-01-03 ENCOUNTER — HOSPITAL ENCOUNTER (OUTPATIENT)
Age: 59
Setting detail: SPECIMEN
Discharge: HOME | End: 2024-01-03

## 2024-01-03 ENCOUNTER — OFFICE VISIT (OUTPATIENT)
Dept: PRIMARY CARE CLINIC | Age: 59
End: 2024-01-03
Payer: COMMERCIAL

## 2024-01-03 VITALS
WEIGHT: 156.2 LBS | HEIGHT: 63 IN | HEART RATE: 78 BPM | OXYGEN SATURATION: 99 % | DIASTOLIC BLOOD PRESSURE: 72 MMHG | BODY MASS INDEX: 27.68 KG/M2 | SYSTOLIC BLOOD PRESSURE: 120 MMHG

## 2024-01-03 DIAGNOSIS — Z12.31 BREAST CANCER SCREENING BY MAMMOGRAM: ICD-10-CM

## 2024-01-03 DIAGNOSIS — Z01.419 ENCOUNTER FOR GYNECOLOGICAL EXAMINATION WITHOUT ABNORMAL FINDING: Primary | ICD-10-CM

## 2024-01-03 DIAGNOSIS — Z00.00 WELLNESS EXAMINATION: ICD-10-CM

## 2024-01-03 DIAGNOSIS — R05.1 ACUTE COUGH: ICD-10-CM

## 2024-01-03 LAB
Lab: NORMAL
QC PASS/FAIL: NORMAL
SARS-COV-2 RDRP RESP QL NAA+PROBE: NEGATIVE

## 2024-01-03 PROCEDURE — 99396 PREV VISIT EST AGE 40-64: CPT | Performed by: FAMILY MEDICINE

## 2024-01-03 PROCEDURE — 87635 SARS-COV-2 COVID-19 AMP PRB: CPT | Performed by: FAMILY MEDICINE

## 2024-01-03 PROCEDURE — G8482 FLU IMMUNIZE ORDER/ADMIN: HCPCS | Performed by: FAMILY MEDICINE

## 2024-01-03 RX ORDER — DOCUSATE SODIUM 100 MG/1
CAPSULE, LIQUID FILLED ORAL
Qty: 60 CAPSULE | Refills: 0 | Status: SHIPPED | OUTPATIENT
Start: 2024-01-03

## 2024-01-03 RX ORDER — AZITHROMYCIN 250 MG/1
250 TABLET, FILM COATED ORAL SEE ADMIN INSTRUCTIONS
Qty: 6 TABLET | Refills: 0 | Status: SHIPPED | OUTPATIENT
Start: 2024-01-03 | End: 2024-01-08

## 2024-01-03 ASSESSMENT — PATIENT HEALTH QUESTIONNAIRE - PHQ9
SUM OF ALL RESPONSES TO PHQ QUESTIONS 1-9: 0
1. LITTLE INTEREST OR PLEASURE IN DOING THINGS: 0
2. FEELING DOWN, DEPRESSED OR HOPELESS: 0
SUM OF ALL RESPONSES TO PHQ QUESTIONS 1-9: 0
SUM OF ALL RESPONSES TO PHQ9 QUESTIONS 1 & 2: 0

## 2024-01-03 ASSESSMENT — ENCOUNTER SYMPTOMS: COUGH: 1

## 2024-01-03 NOTE — PROGRESS NOTES
cancer screening by mammogram  STACY DIGITAL SCREEN W OR WO CAD BILATERAL      4. Wellness examination  Lipid Panel    Glucose, Fasting    PAP Smear           Plan:      Return in about 6 months (around 7/3/2024) for med check.  Covid negative.   Start zpack. If not better come back  in.   Orders Placed This Encounter   Procedures    STACY DIGITAL SCREEN W OR WO CAD BILATERAL     Standing Status:   Future     Standing Expiration Date:   2025     Order Specific Question:   Reason for exam:     Answer:   screening    Lipid Panel     Standing Status:   Future     Standing Expiration Date:   1/3/2025     Order Specific Question:   Is Patient Fasting?/# of Hours     Answer:   yes    Glucose, Fasting     Standing Status:   Future     Standing Expiration Date:   7/3/2024    PAP Smear     Patient History:    Patient's last menstrual period was 2018 (approximate).  OBGYN Status: Postmenopausal  Past Surgical History:  No date:  SECTION  2019: COLONOSCOPY; N/A      Comment:  COLONOSCOPY POLYPECTOMY HOT SNARE performed by Amish Mosley MD at [facility]  2023: COLONOSCOPY; N/A      Comment:  COLONOSCOPY WITH BIOPSY performed by Amish Mosley MD at [facility]  2019: UPPER GASTROINTESTINAL ENDOSCOPY; N/A      Comment:  ESOPHAGEAL CAPSULE ENDOSCOPY - EGD W/BRAVO PLACED AT                29CM, COLD BIOPSY performed by Amish Mosley MD at               [facility]  No date: WISDOM TOOTH EXTRACTION; Right      Comment:  right bottom      Social History    Tobacco Use      Smoking status: Never      Smokeless tobacco: Never       Standing Status:   Future     Standing Expiration Date:   1/3/2025     Order Specific Question:   Collection Type     Answer:   Thin Prep     Order Specific Question:   Prior Abnormal Pap Test     Answer:   No     Order Specific Question:   Screening or Diagnostic     Answer:   Screening     Order Specific Question:   HPV Requested?

## 2024-01-03 NOTE — PATIENT INSTRUCTIONS
prevent STIs if you wait to have sex with a new partner (or partners) until you've each been tested for STIs. It also helps if you use condoms (male or female condoms) and if you limit your sex partners to one person who only has sex with you. Vaccines are available for some STIs.  If you think you may have a problem with alcohol or drug use, talk to your doctor. This includes prescription medicines (such as amphetamines and opioids) and illegal drugs (such as cocaine and methamphetamine). Your doctor can help you figure out what type of treatment is best for you.  Protect your skin from too much sun. When you're outdoors from 10 a.m. to 4 p.m., stay in the shade or cover up with clothing and a hat with a wide brim. Wear sunglasses that block UV rays. Even when it's cloudy, put broad-spectrum sunscreen (SPF 30 or higher) on any exposed skin.  See a dentist one or two times a year for checkups and to have your teeth cleaned.  Wear a seat belt in the car.  When should you call for help?  Watch closely for changes in your health, and be sure to contact your doctor if you have any problems or symptoms that concern you.  Where can you learn more?  Go to [email or web address] and sign in to your [employer] account. Enter Y074 in the Search Health Information box to learn more about \"Well Visit, Women 50 to 65: Care Instructions.\"     If you do not have an account, please click on the \"Sign Up Now\" link.  Current as of: June 6, 2022               Content Version: 13.4  © 2006-2022 [employer].   Care instructions adapted under license by [employer]. If you have questions about a medical condition or this instruction, always ask your healthcare professional. [employer] disclaims any warranty or liability for your use of this information.

## 2024-01-16 ENCOUNTER — HOSPITAL ENCOUNTER (OUTPATIENT)
Age: 59
Discharge: HOME | End: 2024-01-16
Payer: COMMERCIAL

## 2024-01-16 DIAGNOSIS — Z13.1 SCREENING FOR DIABETES MELLITUS: Primary | ICD-10-CM

## 2024-01-16 DIAGNOSIS — Z00.00 WELLNESS EXAMINATION: ICD-10-CM

## 2024-01-16 LAB
CHOLEST SERPL-MCNC: 246 MG/DL
CHOLESTEROL/HDL RATIO: 3.5
CYTOLOGY REPORT: NORMAL
GLUCOSE P FAST SERPL-MCNC: 115 MG/DL (ref 70–99)
HDLC SERPL-MCNC: 70 MG/DL
LDLC SERPL CALC-MCNC: 157 MG/DL (ref 0–130)
TRIGL SERPL-MCNC: 96 MG/DL

## 2024-01-16 PROCEDURE — 36415 COLL VENOUS BLD VENIPUNCTURE: CPT

## 2024-01-16 PROCEDURE — 80061 LIPID PANEL: CPT

## 2024-01-16 PROCEDURE — 82947 ASSAY GLUCOSE BLOOD QUANT: CPT

## 2024-01-17 DIAGNOSIS — R06.02 SOB (SHORTNESS OF BREATH): ICD-10-CM

## 2024-01-17 RX ORDER — ALBUTEROL SULFATE 90 UG/1
2 AEROSOL, METERED RESPIRATORY (INHALATION) EVERY 4 HOURS PRN
Qty: 18 G | Refills: 0 | Status: SHIPPED | OUTPATIENT
Start: 2024-01-17

## 2024-01-17 NOTE — RESULT ENCOUNTER NOTE
LDL cholesterol is too high, has she been eating more animal fats ( beef cheese,etc )  Sugar is  elevated also: decrease sugars and starches.   Recheck Fasting sugar ( glucose) in 3-4 months. Check cholesterol again next year

## 2024-01-17 NOTE — TELEPHONE ENCOUNTER
LAST VISIT:   1/3/2024     Future Appointments   Date Time Provider Department Center   1/23/2024 11:30 AM [facility] DIAG MAMMO [facility] Radiolog   2/6/2024  2:00 PM Amish Mosley MD [facility]   7/3/2024  8:20 AM Jordyn Razo MD [facility]

## 2024-01-20 DIAGNOSIS — R05.1 ACUTE COUGH: ICD-10-CM

## 2024-01-22 ENCOUNTER — TELEPHONE (OUTPATIENT)
Dept: PRIMARY CARE CLINIC | Age: 59
End: 2024-01-22

## 2024-01-22 DIAGNOSIS — R06.02 SOB (SHORTNESS OF BREATH): ICD-10-CM

## 2024-01-22 RX ORDER — AZITHROMYCIN 250 MG/1
TABLET, FILM COATED ORAL
Qty: 6 TABLET | Refills: 0 | OUTPATIENT
Start: 2024-01-22

## 2024-01-23 ENCOUNTER — HOSPITAL ENCOUNTER (OUTPATIENT)
Dept: WOMENS IMAGING | Age: 59
Discharge: HOME | End: 2024-01-25
Payer: COMMERCIAL

## 2024-01-23 VITALS — BODY MASS INDEX: 28.71 KG/M2 | HEIGHT: 62 IN | WEIGHT: 156 LBS

## 2024-01-23 DIAGNOSIS — Z12.31 BREAST CANCER SCREENING BY MAMMOGRAM: ICD-10-CM

## 2024-01-23 PROCEDURE — 77063 BREAST TOMOSYNTHESIS BI: CPT

## 2024-01-23 RX ORDER — ALBUTEROL SULFATE 90 UG/1
2 AEROSOL, METERED RESPIRATORY (INHALATION) EVERY 4 HOURS PRN
Qty: 18 G | Refills: 0 | Status: SHIPPED | OUTPATIENT
Start: 2024-01-23

## 2024-01-24 NOTE — RESULT ENCOUNTER NOTE
Normal mammogram but does have dense breasts, so that decreases the accuracy of the mammogram. Recheck 1 year

## 2024-02-03 DIAGNOSIS — K59.09 OTHER CONSTIPATION: ICD-10-CM

## 2024-02-05 RX ORDER — DOCUSATE SODIUM 100 MG/1
CAPSULE, LIQUID FILLED ORAL
Qty: 60 CAPSULE | Refills: 0 | Status: SHIPPED | OUTPATIENT
Start: 2024-02-05

## 2024-02-06 RX ORDER — OMEPRAZOLE 40 MG/1
40 CAPSULE, DELAYED RELEASE ORAL DAILY
Qty: 30 CAPSULE | Refills: 5 | Status: SHIPPED | OUTPATIENT
Start: 2024-02-06 | End: 2024-08-04

## 2024-02-20 RX ORDER — HYDROCORTISONE 25 MG/G
CREAM TOPICAL
Qty: 28 G | Refills: 0 | Status: SHIPPED | OUTPATIENT
Start: 2024-02-20

## 2024-02-23 ENCOUNTER — OFFICE VISIT (OUTPATIENT)
Dept: PRIMARY CARE CLINIC | Age: 59
End: 2024-02-23
Payer: COMMERCIAL

## 2024-02-23 VITALS
OXYGEN SATURATION: 98 % | HEIGHT: 62 IN | BODY MASS INDEX: 28.97 KG/M2 | HEART RATE: 63 BPM | TEMPERATURE: 98 F | SYSTOLIC BLOOD PRESSURE: 124 MMHG | WEIGHT: 157.4 LBS | DIASTOLIC BLOOD PRESSURE: 90 MMHG

## 2024-02-23 DIAGNOSIS — J40 BRONCHITIS: ICD-10-CM

## 2024-02-23 DIAGNOSIS — R05.2 SUBACUTE COUGH: Primary | ICD-10-CM

## 2024-02-23 PROCEDURE — G8482 FLU IMMUNIZE ORDER/ADMIN: HCPCS | Performed by: FAMILY MEDICINE

## 2024-02-23 PROCEDURE — G8427 DOCREV CUR MEDS BY ELIG CLIN: HCPCS | Performed by: FAMILY MEDICINE

## 2024-02-23 PROCEDURE — 3017F COLORECTAL CA SCREEN DOC REV: CPT | Performed by: FAMILY MEDICINE

## 2024-02-23 PROCEDURE — 99213 OFFICE O/P EST LOW 20 MIN: CPT | Performed by: FAMILY MEDICINE

## 2024-02-23 PROCEDURE — 1036F TOBACCO NON-USER: CPT | Performed by: FAMILY MEDICINE

## 2024-02-23 PROCEDURE — G8419 CALC BMI OUT NRM PARAM NOF/U: HCPCS | Performed by: FAMILY MEDICINE

## 2024-02-23 RX ORDER — AMOXICILLIN AND CLAVULANATE POTASSIUM 875; 125 MG/1; MG/1
1 TABLET, FILM COATED ORAL 2 TIMES DAILY
Qty: 20 TABLET | Refills: 0 | Status: SHIPPED | OUTPATIENT
Start: 2024-02-23 | End: 2024-03-04

## 2024-02-23 NOTE — PROGRESS NOTES
Constitutional:  Negative for chills and fever.       Objective:     BP (!) 124/90   Pulse 63   Temp 98 °F (36.7 °C)   Ht 1.575 m (5' 2\")   Wt 71.4 kg (157 lb 6.4 oz)   LMP 11/01/2018 (Approximate)   SpO2 98%   BMI 28.79 kg/m²   Physical Exam  Vitals and nursing note reviewed.   Constitutional:       General: She is not in acute distress.     Appearance: She is well-developed. She is not ill-appearing.   HENT:      Head: Normocephalic and atraumatic.      Right Ear: Tympanic membrane, ear canal and external ear normal.      Left Ear: Tympanic membrane, ear canal and external ear normal.      Nose: Congestion present.      Comments: Nasal turbinates slightly swollen and red     Mouth/Throat:      Mouth: Mucous membranes are moist.      Pharynx: No posterior oropharyngeal erythema.   Eyes:      General: No scleral icterus.        Right eye: No discharge.         Left eye: No discharge.      Conjunctiva/sclera: Conjunctivae normal.   Neck:      Thyroid: No thyromegaly.      Trachea: No tracheal deviation.   Cardiovascular:      Rate and Rhythm: Normal rate and regular rhythm.      Heart sounds: Normal heart sounds.   Pulmonary:      Effort: Pulmonary effort is normal. No respiratory distress.      Breath sounds: Normal breath sounds. No wheezing.   Lymphadenopathy:      Cervical: No cervical adenopathy.   Skin:     General: Skin is warm.      Findings: No rash.   Neurological:      Mental Status: She is alert and oriented to person, place, and time.   Psychiatric:         Mood and Affect: Mood normal.         Behavior: Behavior normal.         Thought Content: Thought content normal.         Assessment:       Diagnosis Orders   1. Subacute cough  amoxicillin-clavulanate (AUGMENTIN) 875-125 MG per tablet      2. Bronchitis  amoxicillin-clavulanate (AUGMENTIN) 875-125 MG per tablet           Plan:      No follow-ups on file.  Call prn.   If not better check CXR and maybe Sinus CT  Prescription printed and sent

## 2024-02-28 RX ORDER — MONTELUKAST SODIUM 10 MG/1
TABLET ORAL
Qty: 30 TABLET | Refills: 0 | Status: SHIPPED | OUTPATIENT
Start: 2024-02-28

## 2024-03-09 DIAGNOSIS — K59.09 OTHER CONSTIPATION: ICD-10-CM

## 2024-03-11 RX ORDER — DOCUSATE SODIUM 100 MG/1
CAPSULE, LIQUID FILLED ORAL
Qty: 60 CAPSULE | Refills: 0 | Status: SHIPPED | OUTPATIENT
Start: 2024-03-11

## 2024-03-19 DIAGNOSIS — F43.9 STRESS: ICD-10-CM

## 2024-03-19 RX ORDER — HYDROCORTISONE 25 MG/G
CREAM TOPICAL
Qty: 28 G | Refills: 0 | Status: SHIPPED | OUTPATIENT
Start: 2024-03-19

## 2024-03-19 RX ORDER — ESCITALOPRAM OXALATE 10 MG/1
TABLET ORAL
Qty: 30 TABLET | Refills: 0 | Status: SHIPPED | OUTPATIENT
Start: 2024-03-19

## 2024-04-05 DIAGNOSIS — K59.09 OTHER CONSTIPATION: ICD-10-CM

## 2024-04-05 RX ORDER — DOCUSATE SODIUM 100 MG/1
CAPSULE, LIQUID FILLED ORAL
Qty: 60 CAPSULE | Refills: 5 | Status: SHIPPED | OUTPATIENT
Start: 2024-04-05

## 2024-04-05 RX ORDER — MONTELUKAST SODIUM 10 MG/1
TABLET ORAL
Qty: 90 TABLET | Refills: 1 | Status: SHIPPED | OUTPATIENT
Start: 2024-04-05

## 2024-05-31 ENCOUNTER — OFFICE VISIT (OUTPATIENT)
Dept: PRIMARY CARE CLINIC | Age: 59
End: 2024-05-31
Payer: COMMERCIAL

## 2024-05-31 VITALS
SYSTOLIC BLOOD PRESSURE: 94 MMHG | HEART RATE: 67 BPM | WEIGHT: 155.2 LBS | BODY MASS INDEX: 28.56 KG/M2 | OXYGEN SATURATION: 100 % | HEIGHT: 62 IN | DIASTOLIC BLOOD PRESSURE: 70 MMHG

## 2024-05-31 DIAGNOSIS — K21.9 GASTROESOPHAGEAL REFLUX DISEASE, UNSPECIFIED WHETHER ESOPHAGITIS PRESENT: ICD-10-CM

## 2024-05-31 DIAGNOSIS — R05.3 CHRONIC COUGH: ICD-10-CM

## 2024-05-31 DIAGNOSIS — S93.401S SPRAIN OF RIGHT ANKLE, UNSPECIFIED LIGAMENT, SEQUELA: Primary | ICD-10-CM

## 2024-05-31 PROBLEM — M62.838 TRAPEZIUS MUSCLE SPASM: Chronic | Status: RESOLVED | Noted: 2021-06-29 | Resolved: 2024-05-31

## 2024-05-31 PROCEDURE — 3017F COLORECTAL CA SCREEN DOC REV: CPT | Performed by: FAMILY MEDICINE

## 2024-05-31 PROCEDURE — G8419 CALC BMI OUT NRM PARAM NOF/U: HCPCS | Performed by: FAMILY MEDICINE

## 2024-05-31 PROCEDURE — 1036F TOBACCO NON-USER: CPT | Performed by: FAMILY MEDICINE

## 2024-05-31 PROCEDURE — 99213 OFFICE O/P EST LOW 20 MIN: CPT | Performed by: FAMILY MEDICINE

## 2024-05-31 PROCEDURE — G8427 DOCREV CUR MEDS BY ELIG CLIN: HCPCS | Performed by: FAMILY MEDICINE

## 2024-05-31 RX ORDER — FAMOTIDINE 20 MG/1
20 TABLET, FILM COATED ORAL
Qty: 90 TABLET | Refills: 0 | Status: SHIPPED | OUTPATIENT
Start: 2024-05-31 | End: 2024-08-29

## 2024-05-31 SDOH — ECONOMIC STABILITY: FOOD INSECURITY: WITHIN THE PAST 12 MONTHS, THE FOOD YOU BOUGHT JUST DIDN'T LAST AND YOU DIDN'T HAVE MONEY TO GET MORE.: NEVER TRUE

## 2024-05-31 SDOH — ECONOMIC STABILITY: INCOME INSECURITY: HOW HARD IS IT FOR YOU TO PAY FOR THE VERY BASICS LIKE FOOD, HOUSING, MEDICAL CARE, AND HEATING?: NOT HARD AT ALL

## 2024-05-31 SDOH — ECONOMIC STABILITY: FOOD INSECURITY: WITHIN THE PAST 12 MONTHS, YOU WORRIED THAT YOUR FOOD WOULD RUN OUT BEFORE YOU GOT MONEY TO BUY MORE.: NEVER TRUE

## 2024-05-31 NOTE — PROGRESS NOTES
[facility]  [facility]  [address]  [place] [address]  Dept: [phone]    Seth Lim is a 58 y.o. female Established patient, who presents today for her medical conditions/complaintsas noted below.      Chief Complaint   Patient presents with    Joint Swelling     Pt is c/o sx of pain of RT ankle caused by a fall x1 month ago.    Ankle Pain       HPI:     HPI  Patient states she twisted her ankle about 6 weeks ago when she tripped over a small step.  She twisted her ankle with inversion and fell on the right side.  She did pull the muscles in the right medial leg as well but that is feeling okay.  She has no trouble walking at this time and can do stairs fine.  She has very slight tenderness and still swelling in the ankle.  Reviewed prior notes None  Reviewed previous Labs and Imaging  Reviewed last EGD and Colonoscopy reports    No components found for: \"LDLCHOLESTEROL\", \"LDLCALC\"    (goal LDL is <100)   Hemoglobin A1C (%)   Date Value   2020 5.3     TSH (uIU/mL)   Date Value   2022 1.20     BP Readings from Last 3 Encounters:   24 94/70   24 (!) 124/90   24 120/72          (goal 120/80)    Past Medical History:   Diagnosis Date    Asthma     Back pain     LEFT SIDED    COVID-19 2023    GERD (gastroesophageal reflux disease)     History of colon polyps 2019    tubular adenoma x2      Past Surgical History:   Procedure Laterality Date     SECTION      COLONOSCOPY N/A 2019    COLONOSCOPY POLYPECTOMY HOT SNARE performed by Amish Mosley MD at [facility] ENDO    COLONOSCOPY N/A 2023    COLONOSCOPY WITH BIOPSY performed by Amish Mosley MD at [facility] ENDO    UPPER GASTROINTESTINAL ENDOSCOPY N/A 2019    ESOPHAGEAL CAPSULE ENDOSCOPY - EGD W/BRAVO PLACED AT 29CM, COLD BIOPSY performed by Amish Mosley MD at [facility] ENDO    WISDOM TOOTH EXTRACTION Right     right bottom       Family History

## 2024-06-04 ENCOUNTER — HOSPITAL ENCOUNTER (OUTPATIENT)
Dept: GENERAL RADIOLOGY | Age: 59
Discharge: HOME | End: 2024-06-06

## 2024-06-04 ENCOUNTER — HOSPITAL ENCOUNTER (OUTPATIENT)
Age: 59
Discharge: HOME | End: 2024-06-06

## 2024-06-04 DIAGNOSIS — R05.3 CHRONIC COUGH: ICD-10-CM

## 2024-06-04 PROCEDURE — 71046 X-RAY EXAM CHEST 2 VIEWS: CPT

## 2024-06-08 DIAGNOSIS — F43.9 STRESS: ICD-10-CM

## 2024-06-10 RX ORDER — ESCITALOPRAM OXALATE 10 MG/1
TABLET ORAL
Qty: 30 TABLET | Refills: 0 | Status: SHIPPED | OUTPATIENT
Start: 2024-06-10

## 2024-06-23 DIAGNOSIS — L29.2 VULVAR ITCHING: ICD-10-CM

## 2024-06-23 DIAGNOSIS — F43.9 STRESS: ICD-10-CM

## 2024-06-24 RX ORDER — CLOBETASOL PROPIONATE 0.5 MG/G
CREAM TOPICAL
Qty: 15 G | Refills: 0 | Status: SHIPPED | OUTPATIENT
Start: 2024-06-24

## 2024-06-24 RX ORDER — ESCITALOPRAM OXALATE 10 MG/1
TABLET ORAL
Qty: 30 TABLET | Refills: 0 | OUTPATIENT
Start: 2024-06-24

## 2024-07-09 ENCOUNTER — OFFICE VISIT (OUTPATIENT)
Dept: PRIMARY CARE CLINIC | Age: 59
End: 2024-07-09
Payer: COMMERCIAL

## 2024-07-09 VITALS
SYSTOLIC BLOOD PRESSURE: 112 MMHG | HEART RATE: 62 BPM | HEIGHT: 62 IN | OXYGEN SATURATION: 98 % | DIASTOLIC BLOOD PRESSURE: 74 MMHG | BODY MASS INDEX: 28.34 KG/M2 | WEIGHT: 154 LBS

## 2024-07-09 DIAGNOSIS — T78.40XA ALLERGIC REACTION, INITIAL ENCOUNTER: Primary | ICD-10-CM

## 2024-07-09 PROCEDURE — 99213 OFFICE O/P EST LOW 20 MIN: CPT | Performed by: STUDENT IN AN ORGANIZED HEALTH CARE EDUCATION/TRAINING PROGRAM

## 2024-07-09 PROCEDURE — 96372 THER/PROPH/DIAG INJ SC/IM: CPT | Performed by: STUDENT IN AN ORGANIZED HEALTH CARE EDUCATION/TRAINING PROGRAM

## 2024-07-09 RX ORDER — TRIAMCINOLONE ACETONIDE 40 MG/ML
40 INJECTION, SUSPENSION INTRA-ARTICULAR; INTRAMUSCULAR ONCE
Status: COMPLETED | OUTPATIENT
Start: 2024-07-09 | End: 2024-07-09

## 2024-07-09 RX ADMIN — TRIAMCINOLONE ACETONIDE 40 MG: 40 INJECTION, SUSPENSION INTRA-ARTICULAR; INTRAMUSCULAR at 16:23

## 2024-07-09 NOTE — PROGRESS NOTES
[facility]  [facility]  [address]  [facility] [address]  Dept: [phone]    Seth Lim is a 58 y.o. female established patient, who presents acutely for her complaints as noted below:    Chief Complaint   Patient presents with    Rash     Rash all over body x 5 days.        HPI:     Rash over body for 5 days, seemed to happen after seafood. Has been taking benadryl. Symptoms seem to recur a few hours after taking benadryl. Notes some tingling of the lower lip. Denies respiratory compromise.    Reviewed prior notes from PCP.  Reviewed previous labs.    No components found for: \"LDLCHOLESTEROL\", \"LDLCALC\"    (goal LDL is <100)   Hemoglobin A1C (%)   Date Value   2020 5.3     TSH (uIU/mL)   Date Value   2022 1.20     BP Readings from Last 3 Encounters:   24 112/74   24 94/70   24 (!) 124/90          (goal 120/80)    Past Medical History:   Diagnosis Date    Asthma     Back pain     LEFT SIDED    COVID-19 2023    GERD (gastroesophageal reflux disease)     History of colon polyps 2019    tubular adenoma x2      Past Surgical History:   Procedure Laterality Date     SECTION      COLONOSCOPY N/A 2019    COLONOSCOPY POLYPECTOMY HOT SNARE performed by Amish Mosley MD at [facility] ENDO    COLONOSCOPY N/A 2023    COLONOSCOPY WITH BIOPSY performed by Amish Mosley MD at [facility] ENDO    UPPER GASTROINTESTINAL ENDOSCOPY N/A 2019    ESOPHAGEAL CAPSULE ENDOSCOPY - EGD W/BRAVO PLACED AT 29CM, COLD BIOPSY performed by Amish Mosley MD at [facility] ENDO    WISDOM TOOTH EXTRACTION Right     right bottom       Family History   Problem Relation Age of Onset    Liver Cancer Paternal Uncle        Social History     Tobacco Use    Smoking status: Never    Smokeless tobacco: Never   Substance Use Topics    Alcohol use: No     Alcohol/week: 0.0 standard drinks of alcohol      Current Outpatient Medications

## 2024-07-10 ENCOUNTER — HOSPITAL ENCOUNTER (OUTPATIENT)
Age: 59
Discharge: HOME | End: 2024-07-10
Payer: COMMERCIAL

## 2024-07-10 DIAGNOSIS — F43.9 STRESS: ICD-10-CM

## 2024-07-10 DIAGNOSIS — T78.40XA ALLERGIC REACTION, INITIAL ENCOUNTER: ICD-10-CM

## 2024-07-10 PROCEDURE — 82785 ASSAY OF IGE: CPT

## 2024-07-10 PROCEDURE — 86003 ALLG SPEC IGE CRUDE XTRC EA: CPT

## 2024-07-10 PROCEDURE — 36415 COLL VENOUS BLD VENIPUNCTURE: CPT

## 2024-07-10 RX ORDER — ESCITALOPRAM OXALATE 10 MG/1
TABLET ORAL
Qty: 90 TABLET | Refills: 3 | Status: SHIPPED | OUTPATIENT
Start: 2024-07-10

## 2024-07-14 LAB
A ALTERNATA IGE QN: <0.1 KU/L (ref 0–0.34)
A FUMIGATUS IGE QN: <0.1 KU/L (ref 0–0.34)
ALLERGEN BIRCH IGE: <0.1 KU/L (ref 0–0.34)
BERMUDA GRASS IGE QN: <0.1 KU/L (ref 0–0.34)
BOXELDER IGE QN: <0.1 KU/L (ref 0–0.34)
C HERBARUM IGE QN: <0.1 KUL/L (ref 0–0.34)
CALIF WALNUT POLN IGE QN: <0.1 KU/L (ref 0–0.34)
CAT DANDER IGE QN: <0.1 KU/L (ref 0–0.34)
CMN PIGWEED IGE QN: <0.1 KU/L (ref 0–0.34)
COMMON RAGWEED IGE QN: <0.1 KU/L (ref 0–0.34)
COTTONWOOD IGE QN: <0.1 KU/L (ref 0–0.34)
D FARINAE IGE QN: <0.1 KU/L (ref 0–0.34)
D PTERONYSS IGE QN: <0.1 KU/L (ref 0–0.34)
DOG DANDER IGE QN: <0.1 KU/L (ref 0–0.34)
IGE SERPL-ACNC: 16 IU/ML (ref 0–100)
LONDON PLANE IGE QN: <0.1 KU/L (ref 0–0.34)
M RACEMOSUS IGE QN: <0.1 KU/L (ref 0–0.34)
MOUSE EPITH IGE QN: <0.1 KU/L (ref 0–0.34)
MT JUNIPER IGE QN: <0.1 KU/L (ref 0–0.34)
P NOTATUM IGE QN: <0.1 KU/L (ref 0–0.34)
PECAN/HICK TREE IGE QN: <0.1 KU/L (ref 0–0.34)
ROACH IGE QN: <0.1 KU/L (ref 0–0.34)
SALTWORT IGE QN: <0.1 KU/L (ref 0–0.34)
SHEEP SORREL IGE QN: <0.1 KU/L (ref 0–0.34)
TIMOTHY IGE QN: <0.1 KU/L (ref 0–0.34)
WHITE ASH IGE QN: <0.1 KU/L (ref 0–0.34)
WHITE ELM IGE QN: <0.1 KU/L (ref 0–0.34)
WHITE MULBERRY IGE QN: <0.1 KU/L (ref 0–0.34)
WHITE OAK IGE QN: <0.1 KU/L (ref 0–0.34)

## 2024-08-09 ENCOUNTER — TELEPHONE (OUTPATIENT)
Dept: GASTROENTEROLOGY | Age: 59
End: 2024-08-09

## 2024-08-09 ENCOUNTER — OFFICE VISIT (OUTPATIENT)
Dept: PRIMARY CARE CLINIC | Age: 59
End: 2024-08-09
Payer: COMMERCIAL

## 2024-08-09 VITALS
DIASTOLIC BLOOD PRESSURE: 80 MMHG | WEIGHT: 151 LBS | HEIGHT: 62 IN | BODY MASS INDEX: 27.79 KG/M2 | OXYGEN SATURATION: 97 % | HEART RATE: 69 BPM | SYSTOLIC BLOOD PRESSURE: 114 MMHG

## 2024-08-09 DIAGNOSIS — R05.3 CHRONIC COUGH: ICD-10-CM

## 2024-08-09 DIAGNOSIS — K59.09 OTHER CONSTIPATION: ICD-10-CM

## 2024-08-09 DIAGNOSIS — R07.0 THROAT DISCOMFORT: ICD-10-CM

## 2024-08-09 DIAGNOSIS — K21.9 GASTROESOPHAGEAL REFLUX DISEASE, UNSPECIFIED WHETHER ESOPHAGITIS PRESENT: Primary | ICD-10-CM

## 2024-08-09 DIAGNOSIS — Z91.013 SHELLFISH ALLERGY: ICD-10-CM

## 2024-08-09 PROCEDURE — 99214 OFFICE O/P EST MOD 30 MIN: CPT | Performed by: FAMILY MEDICINE

## 2024-08-09 RX ORDER — DOCUSATE SODIUM 100 MG/1
CAPSULE, LIQUID FILLED ORAL
Qty: 60 CAPSULE | Refills: 5 | Status: SHIPPED | OUTPATIENT
Start: 2024-08-09

## 2024-08-09 RX ORDER — FAMOTIDINE 40 MG/1
40 TABLET, FILM COATED ORAL
Qty: 90 TABLET | Refills: 1 | Status: SHIPPED | OUTPATIENT
Start: 2024-08-09 | End: 2024-08-09 | Stop reason: SDUPTHER

## 2024-08-09 RX ORDER — FAMOTIDINE 40 MG/1
40 TABLET, FILM COATED ORAL
Qty: 90 TABLET | Refills: 1 | Status: SHIPPED | OUTPATIENT
Start: 2024-08-09 | End: 2025-02-05

## 2024-08-09 ASSESSMENT — ENCOUNTER SYMPTOMS: RESPIRATORY NEGATIVE: 1

## 2024-08-09 NOTE — PROGRESS NOTES
unspecified whether esophagitis present  famotidine (PEPCID) 40 MG tablet    Jose Nash MD, Gastroenterology, [place]    DISCONTINUED: famotidine (PEPCID) 40 MG tablet      2. Shellfish allergy  Danisha Mccall NP, [facility], [place]      3. Other constipation  docusate sodium (EQUATE STOOL SOFTENER) 100 MG capsule      4. Chronic cough  famotidine (PEPCID) 40 MG tablet    DISCONTINUED: famotidine (PEPCID) 40 MG tablet      5. Throat discomfort  Jose Nash MD, Gastroenterology, [place]           Plan:   Assessment & Plan   Return in about 6 months (around 2/9/2025).  If not improving see GI again  Increase pepcid to 40 mg daily  Stop caffeine    Orders Placed This Encounter   Procedures    Danisha Mccall NP, [facility], [place]     Referral Priority:   Routine     Referral Type:   Eval and Treat     Referral Reason:   Specialty Services Required     Referred to Provider:   Danisha Kirby APRN - CNP     Requested Specialty:   Certified Nurse Practitioner     Number of Visits Requested:   1    Jose Nash MD, Gastroenterology, [place]     Referral Priority:   Routine     Referral Type:   Eval and Treat     Referral Reason:   Specialty Services Required     Referred to Provider:   Jose Montiel MD     Requested Specialty:   Gastroenterology     Number of Visits Requested:   1     Orders Placed This Encounter   Medications    DISCONTD: famotidine (PEPCID) 40 MG tablet     Sig: Take 1 tablet by mouth nightly     Dispense:  90 tablet     Refill:  1    docusate sodium (EQUATE STOOL SOFTENER) 100 MG capsule     Sig: TAKE 1 CAPSULE BY MOUTH TWICE DAILY DO  NOT  TAKE  IF  EXPERIENCING  DIARRHEA     Dispense:  60 capsule     Refill:  5    famotidine (PEPCID) 40 MG tablet     Sig: Take 1 tablet by mouth nightly     Dispense:  90 tablet     Refill:  1       Patient given educationalmaterials - see patient instructions.  Discussed use, benefit, and side effectsof

## 2024-08-09 NOTE — TELEPHONE ENCOUNTER
Gastroesophageal reflux disease, unspecified whether esophagitis present  throat discomfort   Established pt , previously pt of [facility] last seen for O.V. is 5/23  Called to schedule no answer left voicemail

## 2024-08-10 DIAGNOSIS — M47.816 ARTHRITIS OF LUMBAR SPINE: ICD-10-CM

## 2024-08-12 RX ORDER — OMEPRAZOLE 40 MG/1
40 CAPSULE, DELAYED RELEASE ORAL DAILY
Qty: 30 CAPSULE | Refills: 0 | Status: SHIPPED | OUTPATIENT
Start: 2024-08-12

## 2024-08-12 RX ORDER — MELOXICAM 15 MG/1
TABLET ORAL
Qty: 30 TABLET | Refills: 0 | Status: SHIPPED | OUTPATIENT
Start: 2024-08-12

## 2024-09-06 DIAGNOSIS — M47.816 ARTHRITIS OF LUMBAR SPINE: ICD-10-CM

## 2024-09-06 RX ORDER — MELOXICAM 15 MG/1
TABLET ORAL
Qty: 30 TABLET | Refills: 3 | Status: SHIPPED | OUTPATIENT
Start: 2024-09-06

## 2024-09-16 ENCOUNTER — TELEPHONE (OUTPATIENT)
Dept: GASTROENTEROLOGY | Age: 59
End: 2024-09-16

## 2024-09-27 RX ORDER — OMEPRAZOLE 40 MG/1
40 CAPSULE, DELAYED RELEASE ORAL DAILY
Qty: 30 CAPSULE | Refills: 5 | Status: SHIPPED | OUTPATIENT
Start: 2024-09-27

## 2024-10-01 RX ORDER — MONTELUKAST SODIUM 10 MG/1
10 TABLET ORAL DAILY
Qty: 90 TABLET | Refills: 1 | Status: SHIPPED | OUTPATIENT
Start: 2024-10-01

## 2024-10-25 ENCOUNTER — OFFICE VISIT (OUTPATIENT)
Dept: GASTROENTEROLOGY | Age: 59
End: 2024-10-25
Payer: COMMERCIAL

## 2024-10-25 VITALS
WEIGHT: 149 LBS | SYSTOLIC BLOOD PRESSURE: 133 MMHG | DIASTOLIC BLOOD PRESSURE: 75 MMHG | BODY MASS INDEX: 27.42 KG/M2 | HEIGHT: 62 IN | HEART RATE: 67 BPM

## 2024-10-25 DIAGNOSIS — K21.9 GASTROESOPHAGEAL REFLUX DISEASE WITHOUT ESOPHAGITIS: Primary | ICD-10-CM

## 2024-10-25 DIAGNOSIS — K59.09 CHRONIC CONSTIPATION: ICD-10-CM

## 2024-10-25 PROCEDURE — 99214 OFFICE O/P EST MOD 30 MIN: CPT | Performed by: INTERNAL MEDICINE

## 2024-10-25 RX ORDER — POLYETHYLENE GLYCOL 3350 17 G/17G
17 POWDER, FOR SOLUTION ORAL DAILY
Qty: 850 G | Refills: 1 | Status: SHIPPED | OUTPATIENT
Start: 2024-10-25

## 2024-10-25 NOTE — PROGRESS NOTES
\"BASOSABS\"      DIAGNOSTIC TESTING:     No results found.       PHYSICAL EXAMINATION: Vital signs reviewed per the nursing documentation.     LMP 11/01/2018 (Approximate)   There is no height or weight on file to calculate BMI.   Physical Exam  Constitutional:       Appearance: Normal appearance.   HENT:      Head: Normocephalic.      Mouth/Throat:      Mouth: Mucous membranes are moist.   Eyes:      General: No scleral icterus.  Cardiovascular:      Rate and Rhythm: Normal rate and regular rhythm.      Pulses: Normal pulses.      Heart sounds: Normal heart sounds. No murmur heard.     No gallop.   Pulmonary:      Effort: Pulmonary effort is normal. No respiratory distress.      Breath sounds: Normal breath sounds. No stridor. No wheezing, rhonchi or rales.   Abdominal:      General: Abdomen is flat. There is no distension.      Palpations: Abdomen is soft. There is no mass.      Tenderness: There is no abdominal tenderness.   Musculoskeletal:      Cervical back: Neck supple.   Neurological:      Mental Status: She is alert and oriented to person, place, and time.           IMPRESSION: Ms. Lim is a 59 y.o. female with GERD and constipation. Symptoms are partially controlled. She has reflux symptoms at night time    Plan- Will change omeprazole to 20 mg BID  Will start on Miralax 17 g daily  Reflux precautions- Don't lie down after a meal; Eat food slowly and chew thoroughly; Eat several small meals; Don’t bend over or exercise after a meal; Avoid alcohol, chocolate, caffeine, spicy, citrus fruits, tomato based foods, fatty foods or peppermint, Carbonated drinks; Don’t eat or drink anything for 3-4 hours before bedtime; Avoid tight-fitting clothing; Sleep on your LEFT side; Raise the head of your bed 6-8 inches with blocks or a wedge under your mattress;      Medication where reviewed, side effects from the GI medication were reviewed with the patient  We did refill the GI medications            Diet/life

## 2024-12-02 ENCOUNTER — TELEPHONE (OUTPATIENT)
Dept: PRIMARY CARE CLINIC | Age: 59
End: 2024-12-02

## 2024-12-31 DIAGNOSIS — L29.2 VULVAR ITCHING: ICD-10-CM

## 2024-12-31 RX ORDER — CLOBETASOL PROPIONATE 0.5 MG/G
CREAM TOPICAL
Qty: 15 G | Refills: 0 | Status: SHIPPED | OUTPATIENT
Start: 2024-12-31

## 2025-01-21 DIAGNOSIS — K59.09 OTHER CONSTIPATION: ICD-10-CM

## 2025-01-21 RX ORDER — DOCUSATE SODIUM 100 MG/1
CAPSULE, LIQUID FILLED ORAL
Qty: 60 CAPSULE | Refills: 0 | Status: SHIPPED | OUTPATIENT
Start: 2025-01-21

## 2025-02-23 DIAGNOSIS — K59.09 OTHER CONSTIPATION: ICD-10-CM

## 2025-02-24 RX ORDER — DOCUSATE SODIUM 100 MG/1
CAPSULE, LIQUID FILLED ORAL
Qty: 60 CAPSULE | Refills: 0 | Status: SHIPPED | OUTPATIENT
Start: 2025-02-24

## 2025-03-03 RX ORDER — POLYETHYLENE GLYCOL 3350 17 G/17G
POWDER, FOR SOLUTION ORAL
Qty: 714 G | Refills: 0 | Status: SHIPPED | OUTPATIENT
Start: 2025-03-03

## 2025-03-19 ENCOUNTER — OFFICE VISIT (OUTPATIENT)
Dept: PRIMARY CARE CLINIC | Age: 60
End: 2025-03-19
Payer: COMMERCIAL

## 2025-03-19 VITALS
WEIGHT: 149.8 LBS | DIASTOLIC BLOOD PRESSURE: 82 MMHG | OXYGEN SATURATION: 99 % | SYSTOLIC BLOOD PRESSURE: 122 MMHG | BODY MASS INDEX: 27.57 KG/M2 | HEART RATE: 50 BPM | HEIGHT: 62 IN

## 2025-03-19 DIAGNOSIS — G44.329 CHRONIC POST-TRAUMATIC HEADACHE, NOT INTRACTABLE: Primary | ICD-10-CM

## 2025-03-19 PROCEDURE — 99213 OFFICE O/P EST LOW 20 MIN: CPT | Performed by: FAMILY MEDICINE

## 2025-03-19 SDOH — ECONOMIC STABILITY: FOOD INSECURITY: WITHIN THE PAST 12 MONTHS, THE FOOD YOU BOUGHT JUST DIDN'T LAST AND YOU DIDN'T HAVE MONEY TO GET MORE.: NEVER TRUE

## 2025-03-19 SDOH — ECONOMIC STABILITY: FOOD INSECURITY: WITHIN THE PAST 12 MONTHS, YOU WORRIED THAT YOUR FOOD WOULD RUN OUT BEFORE YOU GOT MONEY TO BUY MORE.: NEVER TRUE

## 2025-03-19 ASSESSMENT — PATIENT HEALTH QUESTIONNAIRE - PHQ9
1. LITTLE INTEREST OR PLEASURE IN DOING THINGS: NOT AT ALL
SUM OF ALL RESPONSES TO PHQ QUESTIONS 1-9: 0
2. FEELING DOWN, DEPRESSED OR HOPELESS: NOT AT ALL
SUM OF ALL RESPONSES TO PHQ QUESTIONS 1-9: 0

## 2025-03-19 NOTE — PROGRESS NOTES
Seth Lim is a 59 y.o. femalewho presents today for her medical conditions/complaints as noted below.   Chief Complaint   Patient presents with    Eye Pain     Patient states two months ago she fell on the black ice, and did hit the right side of her head. She states it was fine but then about 3 weeks ago she started getting a sharp pain in right side of her head. Patient notes this comes and goes at random. She says she does also still have some tenderness on her head also.         HPI:     HPI  Patient slipped on the ice 2 months ago and hit the right side of her head especially the lateral eyebrow area hard.  She started getting headaches in that right temple sometimes the left temple area.  The headaches are sharp and quick they do not last long with the sharp and then they sometimes go into a dull ache.  She sometimes gets a quick zinging headache in the posterior neck right or left side along with these new headaches.   Headaches seem worse in the evening and in the am on arising.      She has tried Tylenol and this has helped dull the headaches    Current Outpatient Medications   Medication Sig Dispense Refill    polyethylene glycol (GLYCOLAX) 17 GM/SCOOP powder MIX 17 GRAMS OF POWDER IN 8 OUNCES OF LIQUID AND DRINK ONCE DAILY 714 g 0    docusate sodium (EQUATE STOOL SOFTENER) 100 MG capsule TAKE 1 CAPSULE BY MOUTH TWICE DAILY (DO  NOT  TAKE  IF  HAVING  DIARRHEA) 60 capsule 0    clobetasol (TEMOVATE) 0.05 % cream APPLY  CREAM TOPICALLY TWICE DAILY 15 g 0    omeprazole (PRILOSEC) 20 MG delayed release capsule Take 1 capsule by mouth 2 times daily (before meals) 90 capsule 3    montelukast (SINGULAIR) 10 MG tablet Take 1 tablet by mouth daily 90 tablet 1    escitalopram (LEXAPRO) 10 MG tablet Take 1 tablet by mouth once daily 90 tablet 3    hydrocortisone (PROCTO-MED HC) 2.5 % CREA rectal cream APPLY  CREAM RECTALLY TWICE DAILY 28 g 0    albuterol sulfate HFA (VENTOLIN HFA) 108 (90 Base) MCG/ACT

## 2025-03-31 RX ORDER — MONTELUKAST SODIUM 10 MG/1
10 TABLET ORAL DAILY
Qty: 90 TABLET | Refills: 0 | Status: SHIPPED | OUTPATIENT
Start: 2025-03-31

## 2025-05-04 DIAGNOSIS — K59.09 OTHER CONSTIPATION: ICD-10-CM

## 2025-05-05 RX ORDER — DOCUSATE SODIUM 100 MG/1
CAPSULE, LIQUID FILLED ORAL
Qty: 60 CAPSULE | Refills: 0 | Status: SHIPPED | OUTPATIENT
Start: 2025-05-05

## 2025-06-08 RX ORDER — OMEPRAZOLE 20 MG/1
CAPSULE, DELAYED RELEASE ORAL
Qty: 90 CAPSULE | Refills: 0 | Status: SHIPPED | OUTPATIENT
Start: 2025-06-08

## 2025-06-27 ENCOUNTER — TELEPHONE (OUTPATIENT)
Dept: PRIMARY CARE CLINIC | Age: 60
End: 2025-06-27

## 2025-06-27 DIAGNOSIS — F43.9 STRESS: ICD-10-CM

## 2025-06-27 RX ORDER — OMEPRAZOLE 20 MG/1
20 CAPSULE, DELAYED RELEASE ORAL DAILY
Qty: 90 CAPSULE | Refills: 1 | Status: SHIPPED | OUTPATIENT
Start: 2025-06-27

## 2025-06-27 RX ORDER — MONTELUKAST SODIUM 10 MG/1
10 TABLET ORAL DAILY
Qty: 90 TABLET | Refills: 1 | Status: SHIPPED | OUTPATIENT
Start: 2025-06-27

## 2025-06-27 RX ORDER — ESCITALOPRAM OXALATE 10 MG/1
10 TABLET ORAL DAILY
Qty: 90 TABLET | Refills: 1 | Status: SHIPPED | OUTPATIENT
Start: 2025-06-27

## 2025-07-07 DIAGNOSIS — K59.09 OTHER CONSTIPATION: ICD-10-CM

## 2025-07-07 RX ORDER — DOCUSATE SODIUM 100 MG/1
CAPSULE, LIQUID FILLED ORAL
Qty: 60 CAPSULE | Refills: 0 | Status: SHIPPED | OUTPATIENT
Start: 2025-07-07

## 2025-07-28 RX ORDER — POLYETHYLENE GLYCOL 3350 17 G/17G
POWDER, FOR SOLUTION ORAL
Qty: 714 G | Refills: 0 | Status: SHIPPED | OUTPATIENT
Start: 2025-07-28

## 2025-08-18 DIAGNOSIS — L29.2 VULVAR ITCHING: ICD-10-CM

## 2025-08-18 DIAGNOSIS — F43.9 STRESS: ICD-10-CM

## 2025-08-18 DIAGNOSIS — M47.816 ARTHRITIS OF LUMBAR SPINE: ICD-10-CM

## 2025-08-18 DIAGNOSIS — K59.09 OTHER CONSTIPATION: ICD-10-CM

## 2025-08-18 DIAGNOSIS — R06.02 SOB (SHORTNESS OF BREATH): ICD-10-CM

## 2025-08-18 RX ORDER — CLOBETASOL PROPIONATE 0.5 MG/G
CREAM TOPICAL
Qty: 15 G | Refills: 0 | OUTPATIENT
Start: 2025-08-18

## 2025-08-18 RX ORDER — MELOXICAM 15 MG/1
TABLET ORAL
Qty: 30 TABLET | Refills: 3 | OUTPATIENT
Start: 2025-08-18

## 2025-08-18 RX ORDER — MONTELUKAST SODIUM 10 MG/1
10 TABLET ORAL DAILY
Qty: 90 TABLET | Refills: 1 | OUTPATIENT
Start: 2025-08-18

## 2025-08-18 RX ORDER — ESCITALOPRAM OXALATE 10 MG/1
10 TABLET ORAL DAILY
Qty: 90 TABLET | Refills: 1 | OUTPATIENT
Start: 2025-08-18

## 2025-08-18 RX ORDER — ALBUTEROL SULFATE 90 UG/1
2 INHALANT RESPIRATORY (INHALATION) EVERY 4 HOURS PRN
Qty: 18 G | Refills: 0 | OUTPATIENT
Start: 2025-08-18

## 2025-08-18 RX ORDER — POLYETHYLENE GLYCOL 3350 17 G/17G
POWDER, FOR SOLUTION ORAL
Qty: 714 G | Refills: 0 | OUTPATIENT
Start: 2025-08-18

## 2025-08-18 RX ORDER — OMEPRAZOLE 20 MG/1
20 CAPSULE, DELAYED RELEASE ORAL DAILY
Qty: 90 CAPSULE | Refills: 1 | OUTPATIENT
Start: 2025-08-18

## 2025-08-18 RX ORDER — DOCUSATE SODIUM 100 MG/1
CAPSULE, LIQUID FILLED ORAL
Qty: 60 CAPSULE | Refills: 0 | OUTPATIENT
Start: 2025-08-18

## 2025-08-18 RX ORDER — ALBUTEROL SULFATE 90 UG/1
INHALANT RESPIRATORY (INHALATION)
Qty: 18 G | Refills: 3 | OUTPATIENT
Start: 2025-08-18

## 2025-08-19 PROBLEM — F32.A ANXIETY AND DEPRESSION: Status: ACTIVE | Noted: 2025-08-19

## 2025-08-19 PROBLEM — E78.2 MIXED HYPERLIPIDEMIA: Status: ACTIVE | Noted: 2025-08-19

## 2025-08-19 PROBLEM — F41.9 ANXIETY AND DEPRESSION: Status: ACTIVE | Noted: 2025-08-19

## 2025-08-25 ENCOUNTER — OFFICE VISIT (OUTPATIENT)
Dept: PRIMARY CARE CLINIC | Age: 60
End: 2025-08-25
Payer: COMMERCIAL

## 2025-08-25 VITALS
BODY MASS INDEX: 27.23 KG/M2 | HEART RATE: 66 BPM | SYSTOLIC BLOOD PRESSURE: 110 MMHG | DIASTOLIC BLOOD PRESSURE: 70 MMHG | HEIGHT: 62 IN | WEIGHT: 148 LBS | OXYGEN SATURATION: 98 %

## 2025-08-25 DIAGNOSIS — Z23 NEED FOR VACCINATION: ICD-10-CM

## 2025-08-25 DIAGNOSIS — F32.A ANXIETY AND DEPRESSION: ICD-10-CM

## 2025-08-25 DIAGNOSIS — M47.816 ARTHRITIS OF LUMBAR SPINE: ICD-10-CM

## 2025-08-25 DIAGNOSIS — Z76.89 ENCOUNTER TO ESTABLISH CARE: Primary | ICD-10-CM

## 2025-08-25 DIAGNOSIS — F41.9 ANXIETY AND DEPRESSION: ICD-10-CM

## 2025-08-25 DIAGNOSIS — K64.4 EXTERNAL HEMORRHOID: ICD-10-CM

## 2025-08-25 DIAGNOSIS — K59.09 OTHER CONSTIPATION: ICD-10-CM

## 2025-08-25 DIAGNOSIS — R12 HEART BURN: ICD-10-CM

## 2025-08-25 DIAGNOSIS — E78.2 MIXED HYPERLIPIDEMIA: ICD-10-CM

## 2025-08-25 DIAGNOSIS — J45.909 UNCOMPLICATED ASTHMA, UNSPECIFIED ASTHMA SEVERITY, UNSPECIFIED WHETHER PERSISTENT: ICD-10-CM

## 2025-08-25 PROCEDURE — 90471 IMMUNIZATION ADMIN: CPT | Performed by: PHYSICIAN ASSISTANT

## 2025-08-25 PROCEDURE — 90677 PCV20 VACCINE IM: CPT | Performed by: PHYSICIAN ASSISTANT

## 2025-08-25 PROCEDURE — 99214 OFFICE O/P EST MOD 30 MIN: CPT | Performed by: PHYSICIAN ASSISTANT

## 2025-08-25 RX ORDER — PANTOPRAZOLE SODIUM 40 MG/1
40 TABLET, DELAYED RELEASE ORAL
Qty: 180 TABLET | Refills: 1 | Status: SHIPPED | OUTPATIENT
Start: 2025-08-25

## 2025-08-25 RX ORDER — ESCITALOPRAM OXALATE 10 MG/1
10 TABLET ORAL DAILY
Qty: 90 TABLET | Refills: 1 | Status: SHIPPED | OUTPATIENT
Start: 2025-08-25

## 2025-08-25 RX ORDER — ALBUTEROL SULFATE 90 UG/1
2 INHALANT RESPIRATORY (INHALATION) EVERY 4 HOURS PRN
Qty: 18 G | Refills: 1 | Status: SHIPPED | OUTPATIENT
Start: 2025-08-25

## 2025-08-25 RX ORDER — HYDROCORTISONE ACETATE PRAMOXINE HCL 2.5; 1 G/100G; G/100G
CREAM TOPICAL 3 TIMES DAILY
Qty: 30 G | Refills: 1 | Status: SHIPPED | OUTPATIENT
Start: 2025-08-25

## 2025-08-25 RX ORDER — DOCUSATE SODIUM 100 MG/1
CAPSULE, LIQUID FILLED ORAL
Qty: 180 CAPSULE | Refills: 1 | Status: SHIPPED | OUTPATIENT
Start: 2025-08-25

## 2025-08-25 RX ORDER — MELOXICAM 15 MG/1
15 TABLET ORAL DAILY
Qty: 90 TABLET | Refills: 0 | Status: SHIPPED | OUTPATIENT
Start: 2025-08-25

## 2025-08-25 ASSESSMENT — PATIENT HEALTH QUESTIONNAIRE - PHQ9
5. POOR APPETITE OR OVEREATING: NOT AT ALL
3. TROUBLE FALLING OR STAYING ASLEEP: NOT AT ALL
8. MOVING OR SPEAKING SO SLOWLY THAT OTHER PEOPLE COULD HAVE NOTICED. OR THE OPPOSITE, BEING SO FIGETY OR RESTLESS THAT YOU HAVE BEEN MOVING AROUND A LOT MORE THAN USUAL: NOT AT ALL
10. IF YOU CHECKED OFF ANY PROBLEMS, HOW DIFFICULT HAVE THESE PROBLEMS MADE IT FOR YOU TO DO YOUR WORK, TAKE CARE OF THINGS AT HOME, OR GET ALONG WITH OTHER PEOPLE: NOT DIFFICULT AT ALL
SUM OF ALL RESPONSES TO PHQ QUESTIONS 1-9: 0
SUM OF ALL RESPONSES TO PHQ QUESTIONS 1-9: 0
2. FEELING DOWN, DEPRESSED OR HOPELESS: NOT AT ALL
1. LITTLE INTEREST OR PLEASURE IN DOING THINGS: NOT AT ALL
6. FEELING BAD ABOUT YOURSELF - OR THAT YOU ARE A FAILURE OR HAVE LET YOURSELF OR YOUR FAMILY DOWN: NOT AT ALL
SUM OF ALL RESPONSES TO PHQ QUESTIONS 1-9: 0
4. FEELING TIRED OR HAVING LITTLE ENERGY: NOT AT ALL
SUM OF ALL RESPONSES TO PHQ QUESTIONS 1-9: 0
9. THOUGHTS THAT YOU WOULD BE BETTER OFF DEAD, OR OF HURTING YOURSELF: NOT AT ALL

## (undated) DEVICE — JELLY,LUBE,STERILE,FLIP TOP,TUBE,2-OZ: Brand: MEDLINE

## (undated) DEVICE — ENDO KIT W/SYRINGE: Brand: MEDLINE INDUSTRIES, INC.

## (undated) DEVICE — DEFENDO AIR WATER SUCTION AND BIOPSY VALVE KIT FOR  OLYMPUS: Brand: DEFENDO AIR/WATER/SUCTION AND BIOPSY VALVE

## (undated) DEVICE — FORCEPS BX L240CM WRK CHN 2.8MM STD CAP W/ NDL MIC MESH

## (undated) DEVICE — BITEBLOCK 54FR W/ DENT RIM BLOX

## (undated) DEVICE — POLYP TRAP: Brand: TRAPEASE®

## (undated) DEVICE — SNARE ENDOSCP L240CM LOOP W13MM DIA2.4MM SHT THROW SM OVL

## (undated) DEVICE — SYRINGE MED 50ML LUERSLIP TIP

## (undated) DEVICE — GLOVE ORANGE PI 7   MSG9070

## (undated) DEVICE — GOWN,POLY REINFORCED,LG: Brand: MEDLINE

## (undated) DEVICE — ERBE NESSY® OMEGA PLATE USA (85+23)CM² , WITH CABLE 3 M: Brand: ERBE

## (undated) DEVICE — Z DISCONTINUED NO SUB IDED CAPSULE PH GASTROENTEROLOGY ES MON FOR REFLX DEL SYS BRAVO